# Patient Record
Sex: FEMALE | Race: WHITE | Employment: OTHER | URBAN - METROPOLITAN AREA
[De-identification: names, ages, dates, MRNs, and addresses within clinical notes are randomized per-mention and may not be internally consistent; named-entity substitution may affect disease eponyms.]

---

## 2017-06-02 ENCOUNTER — TELEPHONE (OUTPATIENT)
Dept: DERMATOLOGY | Facility: AMBULATORY SURGERY CENTER | Age: 77
End: 2017-06-02

## 2017-06-02 NOTE — TELEPHONE ENCOUNTER
Patient Appointment Date: 06/19/2017      Michel Chew, 78 y.o., female  Is calling for their Mohs Pre-Op Assessment    does not have Hepatitis C or HIV (If YES, set up consult appointment)  does confirm site      Allergies: Allergies   Allergen Reactions    Lexapro [Escitalopram] Other (comments)     Strange thoughts    Other Medication Other (comments)     Distant past : unable to take some un named anti depressants due to mild side effects. does not have a Pacemaker  does not have a Defibrillator    does not need antibiotics    is not taking NSAIDs. is not taking aspirin    is not taking garlic  is not taking ginkgo  is not taking Ginseng  is not taking fish oils  is not taking vit E    does not take a blood thinner(i.e. Coumadin/Warfarin, Plavix, Pradaxa, Xeralto, Effient)      Pre operative assessment questions asked to patient. Patient has a general understanding of the procedure, and has been versed that there will be local anesthesia used in the procedure and that She will be ok to drive themselves to and from the appointment.

## 2017-06-19 ENCOUNTER — OFFICE VISIT (OUTPATIENT)
Dept: DERMATOLOGY | Facility: AMBULATORY SURGERY CENTER | Age: 77
End: 2017-06-19

## 2017-06-19 VITALS
SYSTOLIC BLOOD PRESSURE: 142 MMHG | HEART RATE: 98 BPM | OXYGEN SATURATION: 96 % | DIASTOLIC BLOOD PRESSURE: 88 MMHG | TEMPERATURE: 98.1 F | BODY MASS INDEX: 36.5 KG/M2 | WEIGHT: 206 LBS | HEIGHT: 63 IN

## 2017-06-19 DIAGNOSIS — C44.321 SQUAMOUS CELL CARCINOMA OF BRIDGE OF NOSE: Primary | ICD-10-CM

## 2017-06-19 NOTE — MR AVS SNAPSHOT
Visit Information Date & Time Provider Department Dept. Phone Encounter #  
 6/19/2017 10:00 AM yKlie Raymundo MD [phone] Your Appointments 10/5/2017  3:00 PM  
ESTABLISHED PATIENT with MD Gail Temple IV, [address], [address] (Yessy Mucdarren) Appt Note: 6m  
 [address] [facility]  
[phone]  
  
   
 [address] [address]  
  
    
 4/16/2018  2:45 PM  
ESTABLISHED PATIENT with MD Gail Vargas GRAY, [address] (Yessy Muck) Appt Note: physical  
 [id number] [facility]  
[phone] Upcoming Health Maintenance Date Due DTaP/Tdap/Td series (1 - Tdap) 5/14/1961 MEDICARE YEARLY EXAM 5/14/2007 GLAUCOMA SCREENING Q2Y 4/7/2012 Pneumococcal 65+ Low/Medium Risk (2 of 2 - PPSV23) 10/9/2016 COLONOSCOPY 11/19/2017 INFLUENZA AGE 9 TO ADULT 8/1/2017 Allergies as of 6/19/2017  Review Complete On: 6/19/2017 By: Connor De La Torre Severity Noted Reaction Type Reactions Lexapro [Escitalopram]  12/07/2016    Other (comments) Strange thoughts Other Medication  10/18/2016    Other (comments) Distant past : unable to take some un named anti depressants due to mild side effects. Current Immunizations  Reviewed on 10/11/2015 Name Date Influenza Vaccine 10/18/2016, 10/9/2015 Pneumococcal Conjugate (PCV-13) 10/9/2015 Zoster Vaccine, Live 10/9/2015 Not reviewed this visit You Were Diagnosed With   
  
 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  
 142/88 (BP 1 Location: Left arm, BP Patient Position: Sitting) 98 98.1 °F (36.7 °C) (Oral) 5' 2.5\" (1.588 m) 206 lb (93.4 kg) 96% BMI OB Status Smoking Status 37.08 kg/m2 Hysterectomy Current Every Day Smoker BMI and BSA Data Body Mass Index Body Surface Area 37.08 kg/m 2 2.03 m 2 Preferred Pharmacy Pharmacy Name Phone [id number] AGUILAR [address] [facility] [phone] Your Updated Medication List  
  
   
This list is accurate as of: 6/19/17 10:57 AM.  Always use your most recent med list.  
  
  
  
  
 Nuno Orick Take  by mouth. 200 mg qhs  
  
 losartan 50 mg tablet Commonly known as:  COZAAR Take 1 Tab by mouth daily. REMERON 30 mg tablet Generic drug:  mirtazapine Take  by mouth nightly. Patient Instructions WOUND CARE INSTRUCTIONS 1. Keep the dressing clean and dry and do not remove for 24/48 hours. 2. Then change the dressing once a day as follows: 
a. Wash hands before and after each dressing change. b. Remove dressing and wash site gently with mild soap and water, rinse, and pat dry. 
c. Apply an ointment (Bacitracin, Polysporin, Neosporin, Petroleum jelly or Aquaphor). d. Apply a non-stick (Telfa) dressing or Band-Aid to cover the wound. Remove pressure bandage Wednesday, then wash gently and apply Vaseline and a band-aid to site daily for 1 week. 3. Watch for: BLEEDING: A small amount of drainage may occur. If bleeding occurs, elevate and rest the surgery site. Apply gauze and steady pressure for 15 minutes. If bleeding continues, call this office. INFECTION: Signs of infection include increased redness, pain, warmth, drainage of pus, and fever. If this occurs, call this office. 4. Special Instructions (follow any that are checked): ·  You have stitches that need to be removed in 0 days ·  Avoid bending at the waist and heavy lifting for two days. ·  Sleep with your head elevated for the next two nights. ·  Rest the surgery site and keep it elevated as much as possible for two days. ·  You may apply an ice-pack for 10-15 minutes every waking hour for the rest of the day. ·  Eat a soft diet and avoid hot food and hot drinks for the rest of the day. ·  Other instructions: Follow up as directed Take Tylenol for pain as needed. Once the site is healed with no remaining bandages or open areas, protect your surgical site and scar from the sun, as this area will be more sensitive. Use a broad spectrum sunscreen SPF 30 or higher daily, and a chemical free product (one containing zinc oxide or titanium dioxide) is a good choice if the area is sensitive. You may begin to gently massage the surgical site in 2-3 weeks, rubbing in a circular motion along the scar. This can help reduce swelling and thickness of a scar. A scar cream may be used beginnning 1 month after the surgery. If you have any questions or concerns, please call our office Monday through Friday at [phone]. Introducing [facility] SERVICES! Ira Jones introduces [employer] patient portal. Now you can access parts of your medical record, email your doctor's office, and request medication refills online. 1. In your internet browser, go to [email or web address] 2. Click on the First Time User? Click Here link in the Sign In box. You will see the New Member Sign Up page. 3. Enter your [employer] Access Code exactly as it appears below. You will not need to use this code after youve completed the sign-up process. If you do not sign up before the expiration date, you must request a new code. · [employer] Access Code: [email or web address] Expires: 7/4/2017  3:34 PM 
 
4. Enter the last four digits of your Social Security Number (xxxx) and Date of Birth (mm/dd/yyyy) as indicated and click Submit. You will be taken to the next sign-up page. 5. Create a [employer] ID.  This will be your [employer] login ID and cannot be changed, so think of one that is secure and easy to remember. 6. Create a [employer] password. You can change your password at any time. 7. Enter your Password Reset Question and Answer. This can be used at a later time if you forget your password. 8. Enter your e-mail address. You will receive e-mail notification when new information is available in [address] 9. Click Sign Up. You can now view and download portions of your medical record. 10. Click the Download Summary menu link to download a portable copy of your medical information. If you have questions, please visit the Frequently Asked Questions section of the [employer] website. Remember, [employer] is NOT to be used for urgent needs. For medical emergencies, dial 911. Now available from your iPhone and Android! Please provide this summary of care documentation to your next provider. Your primary care clinician is listed as [id number] [address] elizabeth IV. If you have any questions after today's visit, please call [phone].

## 2017-06-19 NOTE — PROGRESS NOTES
This note is written by Abdirahman Mora, as dictated by Hilary Nickerson. Fazal San MD.    CC: Squamous cell carcinoma on the right nasal bridge     History of present illness:     Kasie Mehta is a 78 y.o. female referred by Dr. Sumit Henriquez. 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 This is a new squamous cell carcinoma present for approximately one year described as a crusty lesion with no prior treatment. Biopsy confirmed the diagnosis of squamous cell carcinoma, and I reviewed the written pathology. She is feeling well and in her usual state of health today. She has no pain, no current illnesses, no other skin concerns. Her allergies, medications, medical, and social history are reviewed by me today. Exam:     She is an awake, alert, and oriented 78 y.o. female who appears well and in no distress. There is no preauricular, submandibular, or cervical lymphadenopathy. I examined her face. She has a  12 x 10 mm indistinct pink plaque on her right nasal bridge. She confirms location. Assessment/plan:    1. Squamous cell carcinoma, right nasal bridge. I discussed the diagnosis of squamous cell carcinoma and summarized the pathology report. Mohs surgery is indicated by site, size, poor definition, and histology. The procedure was discussed, verbal and written consent were obtained. I performed the procedure. One stage was required to reach a tumor free plane. The surgical defect was managed with a rhombic flap. There were no complications. She will follow up as needed as the site heals. Indications, risks, and options were discussed with Kasie Mehta preoperatively. Risks including, but not limited to: pain, bleeding, infection, tumor recurrence, scarring and damage to motor and/or sensory nerves, were discussed. Kasie Mehta chose Mohs surgery. Kasie Mehta was an acceptable surgery candidate.     Kasie Mehta was placed in the appropriate position on the operating table in the Mohs surgery procedure room. The area was prepped and draped in the standard manner. Gentian violet was used to outline the clinical margins of the tumor. Local anesthesia was then obtained. The grossly visible tumor was then removed, an underlying layer was excised and mapped according to the Mohs technique, and the individual specimens examined microscopically. The process was repeated until microscopic examination of the tissue specimens confirmed a tumor-free plane. Hemostasis was obtained with electrosurgery and pressure. The wound was covered between stages with moist saline gauze. Wound care instructions (written and verbal) and a follow up appointment were given to Delisa Carmona before discharge. Delisa Carmona was discharged in good condition. The wound management options of second intent healing, layered closure, local flap, or full thickness skin graft were discussed. Ms. Charissa Smyth understands the aims, risks, alternatives, and possible complications and elects to proceed with rhombic flap. Ms. Charissa Smyth was placed in a supine position on the operating table in the Mohs surgery procedure room. The area was prepped and draped in the standard manner. Gentian violet was used to outline the proposed flap.  1% lidocaine with epinephrine 1:100,000 was used to supplement the already existing anesthesia. The wound penetrated to the muscular layer and measured 14 x 14 mm. The wound margins were undermined in the subcutaneous plane. The flap was advanced into place. Hemostasis was obtained with spot electrocoagulation. 5-0 polysorb sutures were used to approximate the deep tissues and 6-0 fast gut sutures were used to approximate the skin edges. A pressure dressing utilizing Vaseline, Telfa, gauze and Coverroll was placed. Wound care instructions (written and verbal) and a follow up appointment were given to the patient before discharge. Ms. Marianne Marrero was discharged in good condition. 2. History of skin cancer. I discussed the diagnosis and recommend routine examinations with Dr. Balwinder Kohler for surveillance. The documentation recorded by the scribe accurately reflects the service I personally performed and the decisions made by me. [facility]   OFFICE PROCEDURE PROGRESS NOTE     Chart reviewed for the following:     Franny Fried MD, have reviewed the History, Physical and updated the Allergic reactions for [id number] [facility] performed immediately prior to start of procedure:     Franny Fried MD, have performed the following reviews on Rip Shows prior to the start of the procedure:     * Patient was identified by name and date of birth   * Agreement on procedure being performed was verified   * Risks and Benefits explained to the patient   * Procedure site verified and marked as necessary   * Patient was positioned for comfort   * Consent was signed and verified     Time: 10:32 AM   Date of procedure: 6/19/2017  Procedure performed by: Paramjit Kay.  Tan Fried MD   Provider assisted by: LPN   Patient assisted by: self   How tolerated by patient: tolerated the procedure well with no complications   Comments: none

## 2017-06-19 NOTE — PROGRESS NOTES
Pre-op: Patient present today for the evaluation of SCC to the Right nasal bridge. Procedure explained with full understanding. Vitals:    06/19/17 1030   BP: 142/88   Pulse: 98   Temp: 98.1 °F (36.7 °C)   TempSrc: Oral   SpO2: 96%   Weight: 93.4 kg (206 lb)   Height: 5' 2.5\" (1.588 m)     preoperatively, will continue to monitor. Post-op: Written and verbal post-op wound care instructions given to patient with full understanding of care. Surgical wound bandaged with Vaseline, Telfa, 2x2 gauze, and coverall tape. All questions and concerns addressed. Vitals stable postoperatively.

## 2017-06-19 NOTE — PATIENT INSTRUCTIONS
WOUND CARE INSTRUCTIONS    1. Keep the dressing clean and dry and do not remove for 24/48 hours. 2. Then change the dressing once a day as follows:  a. Wash hands before and after each dressing change. b. Remove dressing and wash site gently with mild soap and water, rinse, and pat dry.  c. Apply an ointment (Bacitracin, Polysporin, Neosporin, Petroleum jelly or Aquaphor). d. Apply a non-stick (Telfa) dressing or Band-Aid to cover the wound. Remove pressure bandage Wednesday, then wash gently and apply Vaseline and a band-aid to site daily for 1 week. 3. Watch for:  BLEEDING: A small amount of drainage may occur. If bleeding occurs, elevate and rest the surgery site. Apply gauze and steady pressure for 15 minutes. If bleeding continues, call this office. INFECTION: Signs of infection include increased redness, pain, warmth, drainage of pus, and fever. If this occurs, call this office. 4. Special Instructions (follow any that are checked):  · [] You have stitches that need to be removed in 0 days  · [x] Avoid bending at the waist and heavy lifting for two days. · [x] Sleep with your head elevated for the next two nights. · [x] Rest the surgery site and keep it elevated as much as possible for two days. · [x] You may apply an ice-pack for 10-15 minutes every waking hour for the rest of the day. · [] Eat a soft diet and avoid hot food and hot drinks for the rest of the day. · [] Other instructions: Follow up as directed  Take Tylenol for pain as needed. Once the site is healed with no remaining bandages or open areas, protect your surgical site and scar from the sun, as this area will be more sensitive. Use a broad spectrum sunscreen SPF 30 or higher daily, and a chemical free product (one containing zinc oxide or titanium dioxide) is a good choice if the area is sensitive. You may begin to gently massage the surgical site in 2-3 weeks, rubbing in a circular motion along the scar. This can help reduce swelling and thickness of a scar. A scar cream may be used beginnning 1 month after the surgery. If you have any questions or concerns, please call our office Monday through Friday at [phone].

## 2017-06-20 RX ORDER — BUPIVACAINE HYDROCHLORIDE AND EPINEPHRINE 2.5; 5 MG/ML; UG/ML
INJECTION, SOLUTION INFILTRATION; PERINEURAL
Qty: 1.5 ML | Refills: 0
Start: 2017-06-20 | End: 2017-08-21

## 2017-06-20 RX ORDER — LIDOCAINE HYDROCHLORIDE AND EPINEPHRINE 10; 10 MG/ML; UG/ML
3 INJECTION, SOLUTION INFILTRATION; PERINEURAL ONCE
Qty: 3 ML | Refills: 0
Start: 2017-06-20 | End: 2017-06-20

## 2017-08-21 ENCOUNTER — OFFICE VISIT (OUTPATIENT)
Dept: NEUROLOGY | Age: 77
End: 2017-08-21

## 2017-08-21 VITALS
WEIGHT: 209 LBS | RESPIRATION RATE: 18 BRPM | BODY MASS INDEX: 37.62 KG/M2 | OXYGEN SATURATION: 97 % | HEART RATE: 108 BPM | SYSTOLIC BLOOD PRESSURE: 144 MMHG | DIASTOLIC BLOOD PRESSURE: 90 MMHG

## 2017-08-21 DIAGNOSIS — I10 ESSENTIAL HYPERTENSION: ICD-10-CM

## 2017-08-21 DIAGNOSIS — Z78.9: Primary | ICD-10-CM

## 2017-08-21 DIAGNOSIS — F32.9 SINGLE CURRENT EPISODE OF MAJOR DEPRESSIVE DISORDER, UNSPECIFIED DEPRESSION EPISODE SEVERITY: ICD-10-CM

## 2017-08-21 NOTE — PROGRESS NOTES
NEUROSCIENCE [place]   NEW PATIENT EVALUATION/CONSULTATION       PATIENT NAME: Robbie Cummings    MRN: [id number]    REASON FOR CONSULTATION: Memory impairment    08/21/17      Previous records (physician notes, laboratory reports, and radiology reports) and imaging studies were reviewed and summarized. My recommendations will be communicated back to the patient's physician(s) via electronic medical record and/or by [address] [facility] [facility] mail. HISTORY OF PRESENT ILLNESS:  Robbie Cummings is a 78 y.o. right handed female presenting for evaluation of memory impairment. Onset and progression: Last 6 months, family reporting memory deficits, patient denies. Neuropsychiatric symptoms     Problems with judgment:No   Reduced interest in hobbies/activities: No   Repeats questions, stories, or statements: No    Trouble recalling people's names: No   Trouble learning how to use a tool or appliance: No   Forgetting the correct month or year: No   Difficulty handling financial affairs (bill-paying, taxes): No   Difficulty remembering appointments:No    Memory: Short term recall- she feels this may be age related  Language:  No word finding difficulty  Change in personality:None  Change in eating habits:None  Physical changes: Denies new focal deficits  Depressive symptoms: treated for depression  Hallucinations/Delusions: None    Ability to function:  Driving: independent, no MVAs or difficulty with navigation  Finances: self controlled, no issues  Cooking:yes, no issues  Manages own medication: yes, not missing doses    Prior work-up: None    Prior treatments: None      PAST MEDICAL HISTORY:  Past Medical History:   Diagnosis Date    Blood in urine     Cancer ([address]     skin : Squamous cell ca. x 2    Microhematuria 2011    negative work up .  Dr. Ari Arthur Overdose of benzodiazepine 12/12/2016    14 Lorazepam , hosp at [facility] Liam Sickle     Pancreatic cyst        PAST SURGICAL HISTORY:  Past Surgical History:   Procedure Laterality Date    HX CHOLECYSTECTOMY      HX COLONOSCOPY  11/19/14    polypectomies, f/u 3 Y, Dr. Talha Cruz      2011    HX MOHS PROCEDURES  06/19/2017    SCC right nasal bridge by Dr. Kin Motta    has rods in back    HX TONSILLECTOMY      HX UROLOGICAL      CYSTOSCOPY       FAMILY HISTORY:   Family History   Problem Relation Age of Onset    Lung Disease Mother      emphysema    Heart Disease Mother     Cancer Father      lung         SOCIAL HISTORY:  Social History     Social History    Marital status:      Spouse name: N/A    Number of children: N/A    Years of education: N/A     Social History Main Topics    Smoking status: Current Every Day Smoker     Packs/day: 1.00    Smokeless tobacco: Never Used    Alcohol use No    Drug use: None    Sexual activity: Not Asked     Other Topics Concern    None     Social History Narrative         MEDICATIONS:   Current Outpatient Prescriptions   Medication Sig Dispense Refill    losartan (COZAAR) 50 mg tablet Take 1 Tab by mouth daily. 90 Tab 3    TRAZODONE HCL (DESYREL PO) Take  by mouth. 200 mg qhs      mirtazapine (REMERON) 30 mg tablet Take  by mouth nightly. ALLERGIES:  Allergies   Allergen Reactions    Lexapro [Escitalopram] Other (comments)     Strange thoughts    Other Medication Other (comments)     Distant past : unable to take some un named anti depressants due to mild side effects. REVIEW OF SYSTEMS:  10 point ROS reviewed with patient. Please see scanned document under media. PHYSICAL EXAM:  Vital Signs:   Visit Vitals    /90    Pulse (!) 108    Resp 18    Wt 94.8 kg (209 lb)    SpO2 97%    BMI 37.62 kg/m2        General Medical Exam:  General:  Well appearing, comfortable, in no apparent distress. Eyes/ENT: see cranial nerve examination. Neck: No masses appreciated. Full range of motion without tenderness.   Respiratory:  Clear to auscultation, good air entry bilaterally. Cardiac:  Regular rate and rhythm, no murmur. GI:  Soft, non-tender, non-distended abdomen. Bowel sounds normal. No masses, organomegaly. Extremities:  No deformities, edema, or skin discoloration. Skin:  No rashes or lesions. Neurological:  · Mental Status:  Alert and oriented to person, place, and time with fluent speech. · MOCA: 29/30 (see scanned media)  · Cranial Nerves:   CNII/III/IV/VI: visual fields full to confrontation, EOMI, PERRL, no ptosis or nystagmus. CN V: Facial sensation intact bilaterally, masseter 5/5   CN VII: Facial muscles slightly asymmetric, mild L decreased NL fold  CN VIII: Hears finger rub well bilaterally, intact vestibular function   CN IX/X: Normal palatal movement   CN XI: Full strength shoulder shrug bilaterally   CN XII: Tongue protrusion full and midline without fasciculation or atrophy  · Motor: Normal tone and muscle bulk with no pronator drift. No atrophy or fasciculations present on examination. Individual muscle group testing:  Shoulder abduction:   Left:5/5   Right : 5/5    Shoulder adduction:   Left:5/5   Right : 5/5    Elbow flexion:      Left:5/5   Right : 5/5  Elbow extension:    Left:5/5   Right : 5/5   Wrist flexion:    Left:5/5   Right : 5/5  Wrist extension:    Left:5/5   Right : 5/5  Arm pronation:   Left:5/5   Right : 5/5  Arm supination:   Left:5/5   Right : 5/5    Finger flexion:    Left:5/5   Right : 5/5    Finger extension:   Left:5/5   Right : 5/5   Finger abduction:  Left:5/5   Right : 5/5   Finger adduction:   Left:5/5   Right : 5/5  Hip flexion:     Left:5/5   Right : 5/5         Hip extension:   Left:5/5   Right : 5/5    Knee flexion:    Left:5/5   Right : 5/5    Knee extension:   Left:5/5   Right : 5/5    Dorsiflexion:     Left:5/5   Right : 5/5  Plantar flexion:    Left:5/5   Right : 5/5      · MSRs: No crossed adductors or clonus.          RIGHT  LEFT   Brachioradialis 2+ 2+   Biceps 2+ 2+   Triceps 2+ 2+   Knee 2+ 2+ Achilles 2+ 2+        Plantar response Downward Downward          · Sensation: Normal and symmetric perception of pinprick, temperature, light touch, proprioception, and vibration; (-) Romberg. · Coordination: No dysmetria. Normal rapid alternating movements; finger-to-nose and heel-to- shin testing are within normal limits. · Gait: Normal native and stress (tandem/heel/toe walking). PERTINENT DATA:  INTERNAL RECORDS:  The patient's electronic medical record was reviewed. The relevant details include:    MRI Results (maximum last 3): Results from [place] encounter on 08/12/16   MRI BRAIN W WO CONT   Narrative **Final Report**      ICD Codes / Adm. Diagnosis: 298.9  R41.0 / Unspecified psychosis    Disorientation, unspecified  Examination:  MRI BRAIN W AND Apolonia Daniels  - [id number] - Aug 12 2016  1:31PM  Accession No:  [id number]  Reason:  confusion      REPORT:  EXAM:  MRI BRAIN W AND WO CON  INDICATION:  confusion  COMPARISON:  None. TECHNIQUE:  MR imaging of the brain was performed with sagittal T1, axial   T1, T2, FLAIR, GRE, DWI/ADC; pre and post contrast multiplanar T1 utilizing   9 mL Gadavist.         FINDINGS:    The ventricles are normal in size and are midline. There is no    intracranial hemorrhage  or extra-axial fluid collection. There is mild T2   hyperintensity in the deep cerebral white matter, likely due to age and   intracranial small vessel disease. No mass lesions are demonstrated. There   is no acute infarction. The major intracranial vascular flow-voids are   patent. There is no abnormal parenchymal or meningeal enhancement. The   paranasal sinuses and mastoid air cells are clear. IMPRESSION:   Mild white matter signal abnormality likely due to age and intracranial   small vessel disease. Otherwise negative brain MRI.            Signing/Reading Doctor: Brian Bhatt ([id number])    Approved: Brian Bhatt ([id number])  Aug 12 2016  1:44PM ASSESSMENT:      ICD-10-CM ICD-9-CM    1. No impairment of memory Z78.9 V49.89    2. Essential hypertension I10 401.9    3. Single current episode of major depressive disorder, unspecified depression episode severity F32.9 36.20    78year old female presenting with memory impairment reported by family (not accompanied at today's appointment-patient denies sx). MOCA 29/30 and not consistent with MCI/dementia. She is testing very appropriately for age. MRI brain from 8/2016 reviewed with mild microvascular ischemic changes, no significant atrophy. Encouraged continued heart healthy diet and exercise as well as regular scheduled cognitive and social engagement. Follow-up Disposition:  Return if symptoms worsen or fail to improve. Greg Parks, DO  Staff Neurologist  Diplomate, [facility] Board of Psychiatry & Neurology       CC Referring provider:    Kirsten Kahn MD

## 2017-08-21 NOTE — PATIENT INSTRUCTIONS
[address] [facility]   Statement to Patients  April 1, 2014      In an effort to ensure the large volume of patient prescription refills is processed in the most efficient and expeditious manner, we are asking our patients to assist us by calling your Pharmacy for all prescription refills, this will include also your  Mail Order Pharmacy. The pharmacy will contact our office electronically to continue the refill process. Please do not wait until the last minute to call your pharmacy. We need at least 48 hours (2days) to fill prescriptions. We also encourage you to call your pharmacy before going to  your prescription to make sure it is ready. With regard to controlled substance prescription refill requests (narcotic refills) that need to be picked up at our office, we ask your cooperation by providing us with at least 72 hours (3days) notice that you will need a refill. We will not refill narcotic prescription refill requests after 4:00pm on any weekday, Monday through Thursday, or after 2:00pm on Fridays, or on the weekends. We encourage everyone to explore another way of getting your prescription refill request processed using [employer], our patient web portal through our electronic medical record system. [employer] is an efficient and effective way to communicate your medication request directly to the office and  downloadable as an mateo on your smart phone . [employer] also features a review functionality that allows you to view your medication list as well as leave messages for your physician. Are you ready to get connected? If so please review the attatched instructions or speak to any of our staff to get you set up right away! Thank you so much for your cooperation. Should you have any questions please contact our Practice Administrator.     The Physicians and Staff,  [facility]     Thank you for choosing [facility] for your     care. You may receive a survey about your visit. We appreciate you taking time     to complete this survey as we use your feedback to improve our services. We     realize we are not perfect, but we strive to provide excellent care.

## 2017-08-21 NOTE — MR AVS SNAPSHOT
Visit Information Date & Time Provider Department Dept. Phone Encounter #  
 8/21/2017  3:00 PM Vi Engle, [facility] at [address] [id number] Follow-up Instructions Return if symptoms worsen or fail to improve. Your Appointments 10/5/2017  3:00 PM  
ESTABLISHED PATIENT with MD Jose Peguero IV, [address], [address] ([address]) Appt Note: 6m  
 [id number] [facility] [address]  
[phone]  
  
   
 [address] [id number]  
  
    
 4/16/2018  2:45 PM  
ESTABLISHED PATIENT with MD Jose Simmons GRAY, [address] ([address]) Appt Note: physical  
 [address] [facility] [address]  
[phone] Upcoming Health Maintenance Date Due DTaP/Tdap/Td series (1 - Tdap) 5/14/1961 MEDICARE YEARLY EXAM 5/14/2007 GLAUCOMA SCREENING Q2Y 4/7/2012 Pneumococcal 65+ Low/Medium Risk (2 of 2 - PPSV23) 10/9/2016 INFLUENZA AGE 9 TO ADULT 8/1/2017 COLONOSCOPY 11/19/2017 Allergies as of 8/21/2017  Review Complete On: 8/21/2017 By: Vi Engle DO Severity Noted Reaction Type Reactions Lexapro [Escitalopram]  12/07/2016    Other (comments) Strange thoughts Other Medication  10/18/2016    Other (comments) Distant past : unable to take some un named anti depressants due to mild side effects. Current Immunizations  Reviewed on 10/11/2015 Name Date Influenza Vaccine 10/18/2016, 10/9/2015 Pneumococcal Conjugate (PCV-13) 10/9/2015 Zoster Vaccine, Live 10/9/2015 Not reviewed this visit You Were Diagnosed With   
  
 Codes Comments No impairment of memory    -  Primary ICD-10-CM: Z78.9 ICD-9-CM: V49.89 Essential hypertension     ICD-10-CM: I10 
ICD-9-CM: 401.9  Single current episode of major depressive disorder, unspecified depression episode severity     ICD-10-CM: F32.9 ICD-9-CM: 296.20 Vitals BP Pulse Resp Weight(growth percentile) SpO2 BMI  
 144/90 (!) 108 18 209 lb (94.8 kg) 97% 37.62 kg/m2 OB Status Smoking Status Hysterectomy Current Every Day Smoker BMI and BSA Data Body Mass Index Body Surface Area  
 37.62 kg/m 2 2.04 m 2 Preferred Pharmacy Pharmacy Name Phone [id number]- [place], [address] [facility] [phone] Your Updated Medication List  
  
   
This list is accurate as of: 8/21/17  3:13 PM.  Always use your most recent med list.  
  
  
  
  
 Ana Laura Rueda Take  by mouth. 200 mg qhs  
  
 losartan 50 mg tablet Commonly known as:  COZAAR Take 1 Tab by mouth daily. REMERON 30 mg tablet Generic drug:  mirtazapine Take  by mouth nightly. Follow-up Instructions Return if symptoms worsen or fail to improve. Patient Instructions PRESCRIPTION REFILL POLICY [facility] Statement to Patients April 1, 2014 In an effort to ensure the large volume of patient prescription refills is processed in the most efficient and expeditious manner, we are asking our patients to assist us by calling your Pharmacy for all prescription refills, this will include also your  Mail Order Pharmacy. The pharmacy will contact our office electronically to continue the refill process. Please do not wait until the last minute to call your pharmacy. We need at least 48 hours (2days) to fill prescriptions. We also encourage you to call your pharmacy before going to  your prescription to make sure it is ready. With regard to controlled substance prescription refill requests (narcotic refills) that need to be picked up at our office, we ask your cooperation by providing us with at least 72 hours (3days) notice that you will need a refill. We will not refill narcotic prescription refill requests after 4:00pm on any weekday, Monday through Thursday, or after 2:00pm on Fridays, or on the weekends. We encourage everyone to explore another way of getting your prescription refill request processed using [employer], our patient web portal through our electronic medical record system. [employer] is an efficient and effective way to communicate your medication request directly to the office and  downloadable as an mateo on your smart phone . [employer] also features a review functionality that allows you to view your medication list as well as leave messages for your physician. Are you ready to get connected? If so please review the attatched instructions or speak to any of our staff to get you set up right away! Thank you so much for your cooperation. Should you have any questions please contact our Practice Administrator. The Physicians and Staff,  [facility] Thank you for choosing [facility] and [facility] for your  
 
care. You may receive a survey about your visit. We appreciate you taking time  
 
to complete this survey as we use your feedback to improve our services. We  
 
realize we are not perfect, but we strive to provide excellent care. [facility]! [facility] introduces [employer] patient portal. Now you can access parts of your medical record, email your doctor's office, and request medication refills online. 1. In your internet browser, go to [email or web address] 2. Click on the First Time User? Click Here link in the Sign In box. You will see the New Member Sign Up page. 3. Enter your [employer] Access Code exactly as it appears below. You will not need to use this code after youve completed the sign-up process. If you do not sign up before the expiration date, you must request a new code.  
 
· [employer] Access Code: [email or web address] 
 Expires: 11/19/2017  3:13 PM 
 
4. Enter the last four digits of your Social Security Number (xxxx) and Date of Birth (mm/dd/yyyy) as indicated and click Submit. You will be taken to the next sign-up page. 5. Create a internetstores ID. This will be your internetstores login ID and cannot be changed, so think of one that is secure and easy to remember. 6. Create a internetstores password. You can change your password at any time. 7. Enter your Password Reset Question and Answer. This can be used at a later time if you forget your password. 8. Enter your e-mail address. You will receive e-mail notification when new information is available in [address] 9. Click Sign Up. You can now view and download portions of your medical record. 10. Click the Download Summary menu link to download a portable copy of your medical information. If you have questions, please visit the Frequently Asked Questions section of the internetstores website. Remember, [employer]stores is NOT to be used for urgent needs. For medical emergencies, dial 911. Now available from your iPhone and Android! Please provide this summary of care documentation to your next provider. Your primary care clinician is listed as [id number] [address] IV. If you have any questions after today's visit, please call [phone].

## 2017-08-23 ENCOUNTER — TELEPHONE (OUTPATIENT)
Dept: NEUROLOGY | Age: 77
End: 2017-08-23

## 2017-08-23 NOTE — TELEPHONE ENCOUNTER
Spoke with patient's daughter. She asked how to go about getting a copy of the office visit from 8/21/17 as her mother told her she did not need to come back to see Dr. Coy Reno. Writer stated she could  a copy of the office note (HIPAA verified) with her photo ID. Advised that in Dr. Amanda Yang note Kwasi Link is testing very appropriately for her age\" and she left the follow up plan to an as needed basis. Ms. Jian Leavitt was given an opportunity to ask questions, repeated information, and verbalized understanding.

## 2017-08-23 NOTE — TELEPHONE ENCOUNTER
Pt's daughter Aubrey Madelyn) would like to talk to the nurse regarding pt's appt on 8/21.  Please call back

## 2017-12-08 ENCOUNTER — ANESTHESIA EVENT (OUTPATIENT)
Dept: ENDOSCOPY | Age: 77
End: 2017-12-08
Payer: MEDICARE

## 2017-12-08 ENCOUNTER — ANESTHESIA (OUTPATIENT)
Dept: ENDOSCOPY | Age: 77
End: 2017-12-08
Payer: MEDICARE

## 2017-12-08 ENCOUNTER — HOSPITAL ENCOUNTER (OUTPATIENT)
Age: 77
Setting detail: OUTPATIENT SURGERY
Discharge: HOME | End: 2017-12-08
Attending: INTERNAL MEDICINE | Admitting: INTERNAL MEDICINE
Payer: MEDICARE

## 2017-12-08 VITALS
HEIGHT: 62 IN | RESPIRATION RATE: 20 BRPM | OXYGEN SATURATION: 99 % | SYSTOLIC BLOOD PRESSURE: 125 MMHG | BODY MASS INDEX: 36.8 KG/M2 | HEART RATE: 73 BPM | WEIGHT: 200 LBS | TEMPERATURE: 97.8 F | DIASTOLIC BLOOD PRESSURE: 75 MMHG

## 2017-12-08 PROCEDURE — 76040000019: Performed by: INTERNAL MEDICINE

## 2017-12-08 PROCEDURE — 74011250636 HC RX REV CODE- 250/636

## 2017-12-08 PROCEDURE — 88305 TISSUE EXAM BY PATHOLOGIST: CPT | Performed by: INTERNAL MEDICINE

## 2017-12-08 PROCEDURE — 77030011640 HC PAD GRND REM COVD -A: Performed by: INTERNAL MEDICINE

## 2017-12-08 PROCEDURE — 76060000031 HC ANESTHESIA FIRST 0.5 HR: Performed by: INTERNAL MEDICINE

## 2017-12-08 PROCEDURE — 74011250636 HC RX REV CODE- 250/636: Performed by: INTERNAL MEDICINE

## 2017-12-08 PROCEDURE — 77030009426 HC FCPS BIOP ENDOSC BSC -B: Performed by: INTERNAL MEDICINE

## 2017-12-08 PROCEDURE — 77030013992 HC SNR POLYP ENDOSC BSC -B: Performed by: INTERNAL MEDICINE

## 2017-12-08 RX ORDER — PROPOFOL 10 MG/ML
INJECTION, EMULSION INTRAVENOUS
Status: DISCONTINUED | OUTPATIENT
Start: 2017-12-08 | End: 2017-12-08 | Stop reason: HOSPADM

## 2017-12-08 RX ORDER — NALOXONE HYDROCHLORIDE 0.4 MG/ML
0.4 INJECTION, SOLUTION INTRAMUSCULAR; INTRAVENOUS; SUBCUTANEOUS
Status: DISCONTINUED | OUTPATIENT
Start: 2017-12-08 | End: 2017-12-08 | Stop reason: HOSPADM

## 2017-12-08 RX ORDER — ATROPINE SULFATE 0.1 MG/ML
0.4 INJECTION INTRAVENOUS
Status: DISCONTINUED | OUTPATIENT
Start: 2017-12-08 | End: 2017-12-08 | Stop reason: HOSPADM

## 2017-12-08 RX ORDER — EPINEPHRINE 0.1 MG/ML
1 INJECTION INTRACARDIAC; INTRAVENOUS
Status: DISCONTINUED | OUTPATIENT
Start: 2017-12-08 | End: 2017-12-08 | Stop reason: HOSPADM

## 2017-12-08 RX ORDER — SODIUM CHLORIDE 9 MG/ML
INJECTION, SOLUTION INTRAVENOUS
Status: DISCONTINUED | OUTPATIENT
Start: 2017-12-08 | End: 2017-12-08 | Stop reason: HOSPADM

## 2017-12-08 RX ORDER — PROPOFOL 10 MG/ML
INJECTION, EMULSION INTRAVENOUS AS NEEDED
Status: DISCONTINUED | OUTPATIENT
Start: 2017-12-08 | End: 2017-12-08 | Stop reason: HOSPADM

## 2017-12-08 RX ORDER — MIDAZOLAM HYDROCHLORIDE 1 MG/ML
.25-5 INJECTION, SOLUTION INTRAMUSCULAR; INTRAVENOUS
Status: DISCONTINUED | OUTPATIENT
Start: 2017-12-08 | End: 2017-12-08 | Stop reason: HOSPADM

## 2017-12-08 RX ORDER — SODIUM CHLORIDE 9 MG/ML
50 INJECTION, SOLUTION INTRAVENOUS CONTINUOUS
Status: DISCONTINUED | OUTPATIENT
Start: 2017-12-08 | End: 2017-12-08 | Stop reason: HOSPADM

## 2017-12-08 RX ORDER — FLUMAZENIL 0.1 MG/ML
0.2 INJECTION INTRAVENOUS
Status: DISCONTINUED | OUTPATIENT
Start: 2017-12-08 | End: 2017-12-08 | Stop reason: HOSPADM

## 2017-12-08 RX ORDER — DEXTROMETHORPHAN/PSEUDOEPHED 2.5-7.5/.8
1.2 DROPS ORAL
Status: DISCONTINUED | OUTPATIENT
Start: 2017-12-08 | End: 2017-12-08 | Stop reason: HOSPADM

## 2017-12-08 RX ADMIN — PROPOFOL 100 MG: 10 INJECTION, EMULSION INTRAVENOUS at 09:41

## 2017-12-08 RX ADMIN — PROPOFOL 30 MG: 10 INJECTION, EMULSION INTRAVENOUS at 09:46

## 2017-12-08 RX ADMIN — SODIUM CHLORIDE: 9 INJECTION, SOLUTION INTRAVENOUS at 09:35

## 2017-12-08 RX ADMIN — PROPOFOL 75 MCG/KG/MIN: 10 INJECTION, EMULSION INTRAVENOUS at 09:41

## 2017-12-08 RX ADMIN — SODIUM CHLORIDE 50 ML/HR: 900 INJECTION, SOLUTION INTRAVENOUS at 09:05

## 2017-12-08 NOTE — H&P
Evert Hatfield M.D.  [phone]            History and Physical       NAME:  Alexey Webb   :   1940   MRN:   [id number]           Consult Date: 2017 9:30 AM    Chief Complaint:  Colon polyp surveillance    History of Present Illness:  Patient is a 78 y.o. who is seen for colon cancer screening and colon polyp surveillance. Denies any ongoing GI complaints. PMH:  Past Medical History:   Diagnosis Date    Blood in urine     Cancer ([address]     skin : Squamous cell ca. x 2    Hypertension     Microhematuria     negative work up . Dr. Chantale Gilliland Overdose of benzodiazepine 2016    14 Lorazepam , hosp [facility]     Pancreatic cyst        PSH:  Past Surgical History:   Procedure Laterality Date    HX CHOLECYSTECTOMY      HX COLONOSCOPY  14    polypectomies, f/u 3 Y, Dr. Jen Mae  2017    SCC right nasal bridge by Dr. Lyndsey Carranza    has rods in back    HX TONSILLECTOMY      HX UROLOGICAL      CYSTOSCOPY       Allergies: Allergies   Allergen Reactions    Lexapro [Escitalopram] Other (comments)     Strange thoughts    Other Medication Other (comments)     Distant past : unable to take some un named anti depressants due to mild side effects. Home Medications:  Prior to Admission Medications   Prescriptions Last Dose Informant Patient Reported? Taking? TRAZODONE HCL (DESYREL PO) 2017 at 2200  Yes Yes   Sig: Take  by mouth. 200 mg qhs   losartan (COZAAR) 50 mg tablet 2017 at 2200  No Yes   Sig: Take 1 Tab by mouth daily. mirtazapine (REMERON) 30 mg tablet 2017 at 2200  Yes Yes   Sig: Take  by mouth nightly.       Facility-Administered Medications: None       Hospital Medications:  Current Facility-Administered Medications   Medication Dose Route Frequency    0.9% sodium chloride infusion  50 mL/hr IntraVENous CONTINUOUS    midazolam (VERSED) injection 0.25-5 mg  0.25-5 mg IntraVENous Multiple    naloxone (NARCAN) injection 0.4 mg  0.4 mg IntraVENous Multiple    flumazenil (ROMAZICON) 0.1 mg/mL injection 0.2 mg  0.2 mg IntraVENous Multiple    simethicone (MYLICON) 81EX/6.0WO oral drops 80 mg  1.2 mL Oral Multiple    atropine injection 0.4 mg  0.4 mg IntraVENous ONCE PRN    EPINEPHrine (ADRENALIN) 0.1 mg/mL syringe 1 mg  1 mg Endoscopically ONCE PRN       Social History:  Social History   Substance Use Topics    Smoking status: Current Every Day Smoker     Packs/day: 1.00    Smokeless tobacco: Never Used    Alcohol use No       Family History:  Family History   Problem Relation Age of Onset    Lung Disease Mother      emphysema    Heart Disease Mother     Cancer Father      lung             Review of Systems:      Constitutional: negative fever, negative chills, negative weight loss  Eyes:   negative visual changes  ENT:   negative sore throat, tongue or lip swelling  Respiratory:  negative cough, negative dyspnea  Cards:  negative for chest pain, palpitations, lower extremity edema  GI:   See HPI  :  negative for frequency, dysuria  Integument:  negative for rash and pruritus  Heme:  negative for easy bruising and gum/nose bleeding  Musculoskel: negative for myalgias,  back pain and muscle weakness  Neuro: negative for headaches, dizziness, vertigo  Psych:  negative for feelings of anxiety, depression       Objective:   Patient Vitals for the past 8 hrs:   BP Temp Pulse Resp SpO2 Height Weight   12/08/17 0855 123/75 98.2 °F (36.8 °C) 77 13 95 % 5' 2\" (1.575 m) 90.7 kg (200 lb)             EXAM:     NEURO-a&o   HEENT-wnl   LUNGS-clear    COR-regular rate and rhythym     ABD-soft , no tenderness, no rebound, good bs     EXT-no edema     Data Review     No results for input(s): WBC, HGB, HCT, PLT, HGBEXT, HCTEXT, PLTEXT in the last 72 hours. No results for input(s): NA, K, CL, CO2, BUN, CREA, GLU, PHOS, CA in the last 72 hours.   No results for input(s): SGOT, GPT, AP, TBIL, TP, ALB, GLOB, GGT, AML, LPSE in the last 72 hours. No lab exists for component: AMYP, HLPSE  No results for input(s): INR, PTP, APTT in the last 72 hours. No lab exists for component: INREXT    Patient Active Problem List   Diagnosis Code    Tobacco abuse Z72.0    Essential hypertension I10    Single current episode of major depressive disorder F32.9    Overdose of benzodiazepine T42.4X1A      Assessment:   · Colon cancer screening and colon polyp surveillance   Plan:   · Colonoscopy today.      Signed By: Lacy Helton MD     12/8/2017  9:30 AM

## 2017-12-08 NOTE — PERIOP NOTES
Report from Perez Haile CRNA, see anesthesia record. ABD remains soft and non-tender post procedure. Pt has no complaints at this time and tolerated the procedure well.

## 2017-12-08 NOTE — ANESTHESIA PREPROCEDURE EVALUATION
Anesthetic History   No history of anesthetic complications            Review of Systems / Medical History  Patient summary reviewed, nursing notes reviewed and pertinent labs reviewed    Pulmonary  Within defined limits                 Neuro/Psych   Within defined limits           Cardiovascular    Hypertension                   GI/Hepatic/Renal  Within defined limits              Endo/Other  Within defined limits           Other Findings              Physical Exam    Airway  Mallampati: II  TM Distance: 4 - 6 cm  Neck ROM: normal range of motion   Mouth opening: Normal     Cardiovascular    Rhythm: regular  Rate: normal         Dental  No notable dental hx       Pulmonary  Breath sounds clear to auscultation               Abdominal         Other Findings            Anesthetic Plan    ASA: 2  Anesthesia type: MAC            Anesthetic plan and risks discussed with: Patient

## 2017-12-08 NOTE — PROGRESS NOTES
Radha [facility]  1940  [id number]    Situation:  Verbal report received from: WILLIAM John rn  Procedure: Procedure(s):  COLONOSCOPY  ENDOSCOPIC POLYPECTOMY  COLON BIOPSY    Background:    Preoperative diagnosis: HISTORY OF POLYPS  Postoperative diagnosis: Diverticulosis  Colon Polys  Hemorrhoids      :  Dr. Hesham Llamas  Assistant(s): Endoscopy Technician-1: Shavon De Guzman  Endoscopy RN-1: Jannie Barry RN    Specimens:   ID Type Source Tests Collected by Time Destination   1 : Rectum Polyp Preservative   Aviva Wise MD 12/8/2017 1166 Pathology   2 : Cecum Polyps Gregative   Aviva Wise MD 12/8/2017 4432 Pathology   3 : Random Colon Biopsies Gregative   Aviva Wise MD 12/8/2017 6276 Pathology   4 : Transverse Polyp Beto Wise MD 12/8/2017 0394 Pathology   5 : Erin Hill MD 12/8/2017 0957 Pathology     H. Pylori  no    Assessment:  Intra-procedure medications   VAnesthesia gave intra-procedure sedation and medications, see anesthesia flow sheet yes    Intravenous fluids: NS@ KVO     Vital signs stable     Abdominal assessment: round and soft     Recommendation:  Discharge patient per MD order. Family or Friend   Permission to share finding with family or friend yes  Endoscopy discharge instructions have been reviewed and given to patient. The patient verbalized understanding and acceptance of instructions.

## 2017-12-08 NOTE — IP AVS SNAPSHOT
[address] 
[phone] Patient: Hina Smith MRN: [id number] GHX: About your hospitalization You were admitted on:  2017 You last received care in the:  [facility] ENDOSCOPY You were discharged on:  2017 Why you were hospitalized Your primary diagnosis was:  Not on File Things You Need To Do (next 8 weeks)  ESTABLISHED PATIENT with Makayla Narvaez MD at 11:30 AM  
Where:  [address], [address] ([facility]) Discharge Orders None A check salma indicates which time of day the medication should be taken. My Medications TAKE these medications as instructed Instructions Each Dose to Equal  
 Morning Noon Evening Bedtime DESYREL PO Your last dose was: Your next dose is: Take  by mouth. 200 mg qhs  
     
   
   
   
  
 losartan 50 mg tablet Commonly known as:  COZAAR Your last dose was: Your next dose is: Take 1 Tab by mouth daily. 50 mg  
    
   
   
   
  
 REMERON 30 mg tablet Generic drug:  mirtazapine Your last dose was: Your next dose is: Take  by mouth nightly. Discharge Instructions [address] [facility]. [facility] Barbie Gregg M.D. 
[phone] COLON DISCHARGE INSTRUCTIONS 
    
2017 Hina Smith :  1940 [facility] Medical Record Number:  [id number] COLONOSCOPY FINDINGS: 
Your colonoscopy showed a total of 4 polyps that were removed and sent to pathology. Evidence of diverticulosis noted throughout the colon and small internal hemorrhoids. DISCOMFORT: 
Redness at IV site- apply warm compress to area; if redness or soreness persist- contact your physician There may be a slight amount of blood passed from the rectum Gaseous discomfort- walking, belching will help relieve any discomfort You may not operate a vehicle for 12 hours You may not engage in an occupation involving machinery or appliances for rest of today You may not drink alcoholic beverages for at least 12 hours Avoid making any critical decisions for at least 24 hour DIET: 
 High fiber diet. Low fat diet.  however -  remember your colon is empty and a heavy meal will produce gas. Avoid these foods:  vegetables, fried / greasy foods, carbonated drinks for today ACTIVITY: 
You may resume your normal daily activities it is recommended that you spend the remainder of the day resting -  avoid any strenuous activity. CALL M.D. ANY SIGN OF: Increasing pain, nausea, vomiting Abdominal distension (swelling) New increased bleeding (oral or rectal) Fever (chills) Pain in chest area Bloody discharge from nose or mouth Shortness of breath Follow-up Instructions: 
 Call Dr. Edith Leventhal if any questions or problems. Telephone # [phone] Biopsy results will be available in  5 to 7 days. Take metamucil 1 tablespoon daily with 4 oz water or yogurt. Should have a repeat colonoscopy in 3 years. Introducing [facility] & HEALTH SERVICES! [facility] introduces PadMatcher patient portal. Now you can access parts of your medical record, email your doctor's office, and request medication refills online. 1. In your internet browser, go to [email or web address] 2. Click on the First Time User? Click Here link in the Sign In box. You will see the New Member Sign Up page. 3. Enter your PadMatcher Access Code exactly as it appears below. You will not need to use this code after youve completed the sign-up process. If you do not sign up before the expiration date, you must request a new code. · PadMatcher Access Code: [email or web address] Expires: 3/8/2018  7:48 AM 
 
 4. Enter the last four digits of your Social Security Number (xxxx) and Date of Birth (mm/dd/yyyy) as indicated and click Submit. You will be taken to the next sign-up page. 5. Create a Tamion ID. This will be your Tamion login ID and cannot be changed, so think of one that is secure and easy to remember. 6. Create a Tamion password. You can change your password at any time. 7. Enter your Password Reset Question and Answer. This can be used at a later time if you forget your password. 8. Enter your e-mail address. You will receive e-mail notification when new information is available in [address] 9. Click Sign Up. You can now view and download portions of your medical record. 10. Click the Download Summary menu link to download a portable copy of your medical information. If you have questions, please visit the Frequently Asked Questions section of the Tamion website. Remember, Tamion is NOT to be used for urgent needs. For medical emergencies, dial 911. Now available from your iPhone and Android! Providers Seen During Your Hospitalization Provider Specialty Primary office phone Johan Tang MD Gastroenterology [phone] Your Primary Care Physician (PCP) Primary Care Physician Office Phone Office Fax S-Tari [address] [place] [address] [phone] You are allergic to the following Allergen Reactions Lexapro (Escitalopram) Other (comments) Strange thoughts Other Medication Other (comments) Distant past : unable to take some un named anti depressants due to mild side effects. Recent Documentation Height Weight Breastfeeding? BMI OB Status Smoking Status 1.575 m 90.7 kg No 36.58 kg/m2 Hysterectomy Current Every Day Smoker Emergency Contacts Name Discharge Info Relation Home Work Mobile [facility] DISCHARGE CAREGIVER [3] Daughter [21]   [phone] Lesly Us DISCHARGE CAREGIVER [3] Other Relative [6]   [phone] Gerson Chunan  Child [2] [phone] Patient Belongings The following personal items are in your possession at time of discharge: 
  Dental Appliances: Partials (in coat pocket)  Visual Aid: None Please provide this summary of care documentation to your next provider. Signatures-by signing, you are acknowledging that this After Visit Summary has been reviewed with you and you have received a copy. Patient Signature:  ____________________________________________________________ Date:  ____________________________________________________________  
  
[facility] Provider Signature:  ____________________________________________________________ Date:  ____________________________________________________________

## 2017-12-08 NOTE — ANESTHESIA POSTPROCEDURE EVALUATION
Post-Anesthesia Evaluation and Assessment    Patient: Vita Stinson MRN: [id number]  SSN: [id number]    YOB: 1940  Age: 78 y.o. Sex: female       Cardiovascular Function/Vital Signs  Visit Vitals    /75    Pulse 77    Temp 36.8 °C (98.2 °F)    Resp 13    Ht 5' 2\" (1.575 m)    Wt 90.7 kg (200 lb)    SpO2 95%    Breastfeeding No    BMI 36.58 kg/m2       Patient is status post MAC anesthesia for Procedure(s):  COLONOSCOPY  ENDOSCOPIC POLYPECTOMY  COLON BIOPSY. Nausea/Vomiting: None    Postoperative hydration reviewed and adequate. Pain:  Pain Scale 1: Numeric (0 - 10) (12/08/17 0855)  Pain Intensity 1: 0 (12/08/17 0855)   Managed    Neurological Status: At baseline    Mental Status and Level of Consciousness: Arousable    Pulmonary Status:   O2 Device: Room air (12/08/17 0855)   Adequate oxygenation and airway patent    Complications related to anesthesia: None    Post-anesthesia assessment completed.  No concerns    Signed By: Sheridan Abad MD     December 8, 2017

## 2018-08-03 ENCOUNTER — OFFICE VISIT (OUTPATIENT)
Dept: NEUROLOGY | Age: 78
End: 2018-08-03

## 2018-08-03 ENCOUNTER — DOCUMENTATION ONLY (OUTPATIENT)
Dept: NEUROLOGY | Age: 78
End: 2018-08-03

## 2018-08-03 VITALS
HEART RATE: 79 BPM | DIASTOLIC BLOOD PRESSURE: 94 MMHG | BODY MASS INDEX: 36.62 KG/M2 | RESPIRATION RATE: 18 BRPM | WEIGHT: 199 LBS | OXYGEN SATURATION: 96 % | HEIGHT: 62 IN | SYSTOLIC BLOOD PRESSURE: 150 MMHG

## 2018-08-03 DIAGNOSIS — R46.81 OBSESSIVE BEHAVIOR: ICD-10-CM

## 2018-08-03 DIAGNOSIS — R46.89 ABNORMAL BEHAVIOR: Primary | ICD-10-CM

## 2018-08-03 RX ORDER — FLUVOXAMINE MALEATE 100 MG/1
TABLET, COATED ORAL
Qty: 60 TAB | Refills: 5 | Status: SHIPPED | OUTPATIENT
Start: 2018-08-03 | End: 2018-11-08

## 2018-08-03 NOTE — MR AVS SNAPSHOT
[facility] [address] 
[phone] Patient: Gemma Leon MRN: [id number] W:3[birth year] Visit Information Date & Time Provider Department Dept. Phone Encounter #  
 8/3/2018 10:00 AM [facility] at [address] [id number] Your Appointments 4/22/2019  2:30 PM  
ESTABLISHED PATIENT with MD Jose Beckford [address] ([facility]) Appt Note: physical  
 [address] [id number]  
[phone]  
  
   
 [address] [address] [id number] Upcoming Health Maintenance Date Due DTaP/Tdap/Td series (1 - Tdap) 5/14/1963 GLAUCOMA SCREENING Q2Y 4/7/2012 Pneumococcal 65+ Low/Medium Risk (2 of 2 - PPSV23) 10/9/2016 Influenza Age 5 to Adult 8/1/2018 MEDICARE YEARLY EXAM 4/17/2019 COLONOSCOPY 12/8/2020 Allergies as of 8/3/2018  Review Complete On: 8/3/2018 By: Hindman Angles, LPN Severity Noted Reaction Type Reactions Lexapro [Escitalopram]  12/07/2016    Other (comments) Strange thoughts Other Medication  10/18/2016    Other (comments) Distant past : unable to take some un named anti depressants due to mild side effects. Current Immunizations  Reviewed on 10/11/2015 Name Date Influenza Vaccine 10/18/2016, 10/9/2015 Pneumococcal Conjugate (PCV-13) 10/9/2015 Zoster Vaccine, Live 10/9/2015 Not reviewed this visit You Were Diagnosed With   
  
 Codes Comments Abnormal behavior    -  Primary ICD-10-CM: R46.89 
ICD-9-CM: 796.4 Obsessive behavior     ICD-10-CM: R46.81 
ICD-9-CM: 300.3 Vitals BP Pulse Resp Height(growth percentile) Weight(growth percentile) SpO2  
 (!) 150/94 79 18 5' 2\" (1.575 m) 199 lb (90.3 kg) 96% BMI OB Status Smoking Status 36.4 kg/m2 Hysterectomy Current Every Day Smoker Vitals History BMI and BSA Data Body Mass Index Body Surface Area  
 36.4 kg/m 2 1.99 m 2 Preferred Pharmacy Pharmacy Name Phone [id number] [place] - [address] [facility] AT [address] [place] [address] [phone] Your Updated Medication List  
  
   
This list is accurate as of 8/3/18 10:45 AM.  Always use your most recent med list.  
  
  
  
  
 fluvoxaMINE 100 mg tablet Commonly known as:  Richard  Take on tab by mouth at bedtime for one week, then 1.5 tabs by mouth at bedtime for one week, then two tabs at bedtime  
  
 losartan 50 mg tablet Commonly known as:  COZAAR  
TAKE 1 TAB BY MOUTH DAILY. Prescriptions Sent to Pharmacy Refills  
 fluvoxaMINE (LUVOX) 100 mg tablet 5 Sig: Take on tab by mouth at bedtime for one week, then 1.5 tabs by mouth at bedtime for one week, then two tabs at bedtime Class: Normal  
 Pharmacy: [address] [place] [address], [place]. Carmelo Goldsmith [address]  #: [phone] We Performed the Following REFERRAL TO NEUROPSYCHOLOGY [AZW47 Custom] Comments:  
 Evaluate for FTD, other atypical dementia. Serious safety concerns, patient living alone and driving Referral Information Referral ID Referred By Referred To  
  
 [id number] Devorah PIERCE [address] [place] [address] [place] [address] [facility], [address] [facility] Phone: [phone] Fax: [phone] Visits Status Start Date End Date 1 New Request 8/3/18 8/3/19 If your referral has a status of pending review or denied, additional information will be sent to support the outcome of this decision. Patient Instructions Fluvoxamine (Luvox) 100 mg tabs: Take 1 tab at bedtime for 1 week If tolerated, increase to 1.5 tabs at bedtime for 1 week If tolerated increase to 2 tabs at bedtime Try to stay on this for at least a month to see if it helps with nail biting A Healthy Lifestyle: Care Instructions Your Care Instructions A healthy lifestyle can help you feel good, stay at a healthy weight, and have plenty of energy for both work and play. A healthy lifestyle is something you can share with your whole family. A healthy lifestyle also can lower your risk for serious health problems, such as high blood pressure, heart disease, and diabetes. You can follow a few steps listed below to improve your health and the health of your family. Follow-up care is a key part of your treatment and safety. Be sure to make and go to all appointments, and call your doctor if you are having problems. It's also a good idea to know your test results and keep a list of the medicines you take. How can you care for yourself at home? · Do not eat too much sugar, fat, or fast foods. You can still have dessert and treats now and then. The goal is moderation. · Start small to improve your eating habits. Pay attention to portion sizes, drink less juice and soda pop, and eat more fruits and vegetables. ¨ Eat a healthy amount of food. A 3-ounce serving of meat, for example, is about the size of a deck of cards. Fill the rest of your plate with vegetables and whole grains. ¨ Limit the amount of soda and sports drinks you have every day. Drink more water when you are thirsty. ¨ Eat at least 5 servings of fruits and vegetables every day. It may seem like a lot, but it is not hard to reach this goal. A serving or helping is 1 piece of fruit, 1 cup of vegetables, or 2 cups of leafy, raw vegetables. Have an apple or some carrot sticks as an afternoon snack instead of a candy bar. Try to have fruits and/or vegetables at every meal. 
· Make exercise part of your daily routine. You may want to start with simple activities, such as walking, bicycling, or slow swimming.  Try to be active 30 to 60 minutes every day. You do not need to do all 30 to 60 minutes all at once. For example, you can exercise 3 times a day for 10 or 20 minutes. Moderate exercise is safe for most people, but it is always a good idea to talk to your doctor before starting an exercise program. 
· Keep moving. [place] the lawn, work in the garden, or [employer]. Take the stairs instead of the elevator at work. · If you smoke, quit. People who smoke have an increased risk for heart attack, stroke, cancer, and other lung illnesses. Quitting is hard, but there are ways to boost your chance of quitting tobacco for good. ¨ Use nicotine gum, patches, or lozenges. ¨ Ask your doctor about stop-smoking programs and medicines. ¨ Keep trying. In addition to reducing your risk of diseases in the future, you will notice some benefits soon after you stop using tobacco. If you have shortness of breath or asthma symptoms, they will likely get better within a few weeks after you quit. · Limit how much alcohol you drink. Moderate amounts of alcohol (up to 2 drinks a day for men, 1 drink a day for women) are okay. But drinking too much can lead to liver problems, high blood pressure, and other health problems. Family health If you have a family, there are many things you can do together to improve your health. · Eat meals together as a family as often as possible. · Eat healthy foods. This includes fruits, vegetables, lean meats and dairy, and whole grains. · Include your family in your fitness plan. Most people think of activities such as jogging or tennis as the way to fitness, but there are many ways you and your family can be more active. Anything that makes you breathe hard and gets your heart pumping is exercise. Here are some tips: 
¨ Walk to do errands or to take your child to school or the bus. ¨ Go for a family bike ride after dinner instead of watching TV. Where can you learn more? Go to http://jazzy-jordy.info/. Enter F562 in the search box to learn more about \"A Healthy Lifestyle: Care Instructions. \" Current as of: December 7, 2017 Content Version: 11.7 © 0056-5090 [employer]. Care instructions adapted under license by [employer] (which disclaims liability or warranty for this information). If you have questions about a medical condition or this instruction, always ask your healthcare professional. [place] [address] any warranty or liability for your use of this information. Fluvoxamine (Luvox CR) - (By mouth) Why this medicine is used:  
Treats obsessive-compulsive disorder (OCD). Contact a nurse or doctor right away if you have: · Blistering, peeling, red skin rash · Fast, pounding, or uneven heartbeat · Unusual behavior, thoughts of hurting yourself or others, panic, trouble sleeping · Worsening depression · Anxiety, restlessness, seeing or hearing things that are not there · Confusion, weakness, and muscle twitching, seizures, eye pain, vision changes · Headache, trouble concentrating, memory problems, fever · Nausea, vomiting, diarrhea, constipation, loss of appetite, weight loss Common side effects: 
· Sexual problems © 2017 [address]  Information is for End User's use only and may not be sold, redistributed or otherwise used for commercial purposes. Introducing hospitals & HEALTH SERVICES! [facility] introduces E-Sign patient portal. Now you can access parts of your medical record, email your doctor's office, and request medication refills online. 1. In your internet browser, go to [email or web address] 2. Click on the First Time User? Click Here link in the Sign In box. You will see the New Member Sign Up page. 3. Enter your E-Sign Access Code exactly as it appears below. You will not need to use this code after youve completed the sign-up process.  If you do not sign up before the expiration date, you must request a new code. · [employer] Access Code: [email or web address] Expires: 11/1/2018  9:43 AM 
 
4. Enter the last four digits of your Social Security Number (xxxx) and Date of Birth (mm/dd/yyyy) as indicated and click Submit. You will be taken to the next sign-up page. 5. Create a [employer] ID. This will be your [employer] login ID and cannot be changed, so think of one that is secure and easy to remember. 6. Create a [employer] password. You can change your password at any time. 7. Enter your Password Reset Question and Answer. This can be used at a later time if you forget your password. 8. Enter your e-mail address. You will receive e-mail notification when new information is available in [address] 9. Click Sign Up. You can now view and download portions of your medical record. 10. Click the Download Summary menu link to download a portable copy of your medical information. If you have questions, please visit the Frequently Asked Questions section of the [employer] website. Remember, [employer] is NOT to be used for urgent needs. For medical emergencies, dial 911. Now available from your iPhone and Android! Please provide this summary of care documentation to your next provider. Your primary care clinician is listed as [id number] Darling IV. If you have any questions after today's visit, please call [phone].

## 2018-08-03 NOTE — PROGRESS NOTES
Date:            18     Name:  Sugey De La O  :  [id number]  MRN:  [id number]     PCP:  Tanya Vickers MD    Chief Complaint   Patient presents with    Memory Loss         HISTORY OF PRESENT ILLNESS:  Krishna Newell is a 70 y.o., female who presents today for follow up for memory impairment. Daughter is with her, providing most of her history. Daughter reports that about 2.5 years ago, patient's personality changed. She did have a brain MRI around then that showed mild white matter disease. Saw Iliana Nolasco last year who completed MMSE and felt it was not consistent with dementia and patient told to follow up PRN. Patient was alone at that appointment, but daughter has much more history to provide today. Patient is less opinionated, milder, less engaged, less put together in appearance. She is incontinent of stool and urine and not aware, does not seem bothered when it is pointed it to her. Memory is great, but reasoning and judgement are off. Had an episode a few years ago where she could not sleep for 48 hours, took extra xanax or ativan from PCP but then got nervous and called a friend. Ended up admitted at [facility]. She has gone downhill since then progressively. When daughter finally saw the patient's house, it was extremely messy. It took a the patient's daughter a week to clean it up. She needs prompts to bathe, dress properly, keep in her dentures. She is only on one medication, manages her own pills. PCP put her on Zoloft for anxiety, nail biting but it did not help and has been stopped. She will walk towards people in the store inappropriately as if she has no concept of the personal space. She has had some outbursts, cursing. Exercise tolerance is down, she smokes heavily. She is driving. She lives alone in a condo that she owns, mostly just stays up all night smoking and watching TV and sleeps all day. Patient reports that she feels fine. She denies trouble with memory. Reports that she is able to remember names, appointments, no trouble driving to places she is familiar with. She sleeps well. Feels rested when she wakes up, does not think that she snores. She plays cards one a week to stay busy, she admits that she stays up late watching tv and sleeps late. She does not read, mostly watches TV. She still cooks for herself. Denies appetite changes, taste changes but daughter reports that she is eating a lot more candy. Patient reports that she eats candy to keep herself from biting her nails. This is a new problem. She feels mood is stable, did have severe depression in her 35s and got E[facility] at [facility]. 8.21.2017 recap  Olga Israel is a 78 y.o. right handed female presenting for evaluation of memory impairment.       Onset and progression: Last 6 months, family reporting memory deficits, patient denies.       Neuropsychiatric symptoms                           Problems with judgment:No                        Reduced interest in hobbies/activities: No                        Repeats questions, stories, or statements: No                                            Trouble recalling people's names: No                        Trouble learning how to use a tool or appliance: No                        Forgetting the correct month or year: No                        Difficulty handling financial affairs (bill-paying, taxes):  No                        Difficulty remembering appointments:No     Memory: Short term recall- she feels this may be age related  Language:  No word finding difficulty  Change in personality:None  Change in eating habits:None  Physical changes: Denies new focal deficits  Depressive symptoms: treated for depression  Hallucinations/Delusions: None     Ability to function:  Driving: independent, no MVAs or difficulty with navigation  Finances: self controlled, no issues  Cooking:yes, no issues  Manages own medication: yes, not missing doses     Prior work-up: None     Prior treatments: None     Current Outpatient Prescriptions   Medication Sig    losartan (COZAAR) 50 mg tablet TAKE 1 TAB BY MOUTH DAILY. No current facility-administered medications for this visit. Allergies   Allergen Reactions    Lexapro [Escitalopram] Other (comments)     Strange thoughts    Other Medication Other (comments)     Distant past : unable to take some un named anti depressants due to mild side effects. Past Medical History:   Diagnosis Date    Blood in urine     Cancer ([address]     skin : Squamous cell ca. x 2    Hypertension     Microhematuria 2011    negative work up . Dr. Chantale Gilliland Overdose of benzodiazepine 12/12/2016    14 Lorazepam , hosp at [facility]     Pancreatic cyst      Past Surgical History:   Procedure Laterality Date    COLONOSCOPY N/A 12/8/2017    COLONOSCOPY performed by Litzy Whittington MD at [address] [facility] HX CHOLECYSTECTOMY      HX COLONOSCOPY  11/19/14    polypectomies, f/u 3 Y, Dr. Lisbeth Perkins      2011    HX MOHS PROCEDURES  06/19/2017    SCC right nasal bridge by Dr. Lyndsey Carranza    has rods in back    HX TONSILLECTOMY      HX UROLOGICAL      CYSTOSCOPY     Social History     Social History    Marital status:      Spouse name: N/A    Number of children: N/A    Years of education: N/A     Occupational History    Not on file.      Social History Main Topics    Smoking status: Current Every Day Smoker     Packs/day: 1.00    Smokeless tobacco: Never Used    Alcohol use No    Drug use: No    Sexual activity: Not on file     Other Topics Concern    Not on file     Social History Narrative     Family History   Problem Relation Age of Onset    Lung Disease Mother      emphysema    Heart Disease Mother     Cancer Father      lung         PHYSICAL EXAMINATION:    Visit Vitals    BP (!) 150/94    Pulse 79    Resp 18    Ht 5' 2\" (1.575 m)    Wt 90.3 kg (199 lb)    SpO2 96%    BMI 36.4 kg/m2 General:  Well defined, nourished, and groomed individual in no acute distress. Neck: Supple, nontender, no bruits, no pain with resistance to active range of motion. Heart: Regular rate and rhythm, no murmurs, rub, or gallop. Normal S1S2. Lungs:  Clear to auscultation bilaterally with equal chest expansion, no cough, no wheeze  Musculoskeletal:  Extremities revealed no edema and had full range of motion of joints. Psych:  Good mood and bright affect    NEUROLOGICAL EXAMINATION:     Mental Status:   Alert and oriented to self, year, location, president. Banana and an orange are fruit, watch and a ruler tell you information. Speech is clear and appropriate. Cranial Nerves:    II, III, IV, VI:  Visual acuity grossly intact. Pupils are equal, round, and reactive to light. Extra-ocular movements are full and fluid. No ptosis or nystagmus. V-XII: Hearing is grossly intact. Facial features are symmetric, with normal sensation and strength. The palate rises symmetrically and the tongue protrudes midline. Sternocleidomastoids 5/5. Motor Examination: Normal tone, bulk, and strength, 5/5 muscle strength throughout. No cogwheeling  Coordination:  Finger to nose testing was normal.   No resting or intention tremor  Gait and Station:  Steady while walking. Normal arm swing. No pronator drift. No muscle wasting or fasciculations noted. ASSESSMENT AND PLAN    ICD-10-CM ICD-9-CM    1. Abnormal behavior R46.89 796.4 REFERRAL TO NEUROPSYCHOLOGY   2. Obsessive behavior R46.81 300.3 fluvoxaMINE (LUVOX) 100 mg tablet     42-year-old female seen in follow-up for behavioral changes. No real memory issues. She was seen by Dr. Carline Zambrano last year, patient was alone and denied new problems. Patient's daughter is with her today and reports a significant amount of changes over the past 2.5 years.   House has become very messy, patient is unkempt, she is incontinent and not concerned about that, she started compulsively biting her nails, chews candy all day to keep herself from doing that. Exam is  unremarkable. My concern is for frontotemporal dementia, and patient is living alone and driving so I have some serious safety concerns there. 1.  Referral to Dr. Celia Day for neuropsychological evaluation, would appreciate recommendations regarding driving, living independently based on results  2. We will start fluvoxamine for obsessive behavior, start with 100 mg nightly and titrate 200 mg nightly if tolerated. If she fails that, will refer to geropsychiatry but I am not sure how receptive she would be to see a psychiatrist    Follow-up after neuropsych, call sooner with concerns        Rayshawn Guillen NP    This note was created using voice recognition software. Despite editing, there may be syntax errors.

## 2018-08-03 NOTE — PATIENT INSTRUCTIONS
Fluvoxamine (Luvox) 100 mg tabs: Take 1 tab at bedtime for 1 week  If tolerated, increase to 1.5 tabs at bedtime for 1 week  If tolerated increase to 2 tabs at bedtime    Try to stay on this for at least a month to see if it helps with nail biting         A Healthy Lifestyle: Care Instructions  Your Care Instructions    A healthy lifestyle can help you feel good, stay at a healthy weight, and have plenty of energy for both work and play. A healthy lifestyle is something you can share with your whole family. A healthy lifestyle also can lower your risk for serious health problems, such as high blood pressure, heart disease, and diabetes. You can follow a few steps listed below to improve your health and the health of your family. Follow-up care is a key part of your treatment and safety. Be sure to make and go to all appointments, and call your doctor if you are having problems. It's also a good idea to know your test results and keep a list of the medicines you take. How can you care for yourself at home? · Do not eat too much sugar, fat, or fast foods. You can still have dessert and treats now and then. The goal is moderation. · Start small to improve your eating habits. Pay attention to portion sizes, drink less juice and soda pop, and eat more fruits and vegetables. ¨ Eat a healthy amount of food. A 3-ounce serving of meat, for example, is about the size of a deck of cards. Fill the rest of your plate with vegetables and whole grains. ¨ Limit the amount of soda and sports drinks you have every day. Drink more water when you are thirsty. ¨ Eat at least 5 servings of fruits and vegetables every day. It may seem like a lot, but it is not hard to reach this goal. A serving or helping is 1 piece of fruit, 1 cup of vegetables, or 2 cups of leafy, raw vegetables. Have an apple or some carrot sticks as an afternoon snack instead of a candy bar.  Try to have fruits and/or vegetables at every meal.  · Make exercise part of your daily routine. You may want to start with simple activities, such as walking, bicycling, or slow swimming. Try to be active 30 to 60 minutes every day. You do not need to do all 30 to 60 minutes all at once. For example, you can exercise 3 times a day for 10 or 20 minutes. Moderate exercise is safe for most people, but it is always a good idea to talk to your doctor before starting an exercise program.  · Keep moving. Elizabeth  the lawn, work in the garden, or [employer] Take the stairs instead of the elevator at work. · If you smoke, quit. People who smoke have an increased risk for heart attack, stroke, cancer, and other lung illnesses. Quitting is hard, but there are ways to boost your chance of quitting tobacco for good. ¨ Use nicotine gum, patches, or lozenges. ¨ Ask your doctor about stop-smoking programs and medicines. ¨ Keep trying. In addition to reducing your risk of diseases in the future, you will notice some benefits soon after you stop using tobacco. If you have shortness of breath or asthma symptoms, they will likely get better within a few weeks after you quit. · Limit how much alcohol you drink. Moderate amounts of alcohol (up to 2 drinks a day for men, 1 drink a day for women) are okay. But drinking too much can lead to liver problems, high blood pressure, and other health problems. Family health  If you have a family, there are many things you can do together to improve your health. · Eat meals together as a family as often as possible. · Eat healthy foods. This includes fruits, vegetables, lean meats and dairy, and whole grains. · Include your family in your fitness plan. Most people think of activities such as jogging or tennis as the way to fitness, but there are many ways you and your family can be more active. Anything that makes you breathe hard and gets your heart pumping is exercise.  Here are some tips:  ¨ Walk to do errands or to take your child to school or the bus. ¨ Go for a family bike ride after dinner instead of watching TV. Where can you learn more? Go to http://jazzy-jordy.info/. Enter N431 in the search box to learn more about \"A Healthy Lifestyle: Care Instructions. \"  Current as of: December 7, 2017  Content Version: 11.7  © 20067519-4636 [employer]. Care instructions adapted under license by [employer] (which disclaims liability or warranty for this information). If you have questions about a medical condition or this instruction, always ask your healthcare professional. [place] [address] any warranty or liability for your use of this information. Fluvoxamine (Luvox CR) - (By mouth)   Why this medicine is used:   Treats obsessive-compulsive disorder (OCD). Contact a nurse or doctor right away if you have:  · Blistering, peeling, red skin rash  · Fast, pounding, or uneven heartbeat  · Unusual behavior, thoughts of hurting yourself or others, panic, trouble sleeping  · Worsening depression  · Anxiety, restlessness, seeing or hearing things that are not there  · Confusion, weakness, and muscle twitching, seizures, eye pain, vision changes  · Headache, trouble concentrating, memory problems, fever  · Nausea, vomiting, diarrhea, constipation, loss of appetite, weight loss     Common side effects:  · Sexual problems  © 2017 [address] is for End User's use only and may not be sold, redistributed or otherwise used for commercial purposes.

## 2018-08-06 ENCOUNTER — TELEPHONE (OUTPATIENT)
Dept: NEUROLOGY | Age: 78
End: 2018-08-06

## 2018-08-06 NOTE — TELEPHONE ENCOUNTER
I have not finished her note or sent him a message. I will do it now. September is actually pretty fasty, I think that is fine.

## 2018-08-06 NOTE — TELEPHONE ENCOUNTER
Did Rosas Leal have any luck getting pt in to see Dr. Maurilio Davenport in for a sooner appt. ? Please call back.

## 2018-08-06 NOTE — TELEPHONE ENCOUNTER
Spoke with patient's daughter, Early Poli (on HIPAA) and informed her that her mother is actually scheduled on 9/12/18. Advised to arrived at least 30 minutes early and provided her with the office address. She voiced understanding.

## 2018-09-12 ENCOUNTER — OFFICE VISIT (OUTPATIENT)
Dept: NEUROLOGY | Age: 78
End: 2018-09-12

## 2018-09-12 DIAGNOSIS — R41.89 COGNITIVE AND BEHAVIORAL CHANGES: ICD-10-CM

## 2018-09-12 DIAGNOSIS — F32.A CHRONIC DEPRESSION: ICD-10-CM

## 2018-09-12 DIAGNOSIS — G31.84 MILD COGNITIVE IMPAIRMENT: Primary | ICD-10-CM

## 2018-09-12 DIAGNOSIS — R46.89 ABNORMAL BEHAVIOR: ICD-10-CM

## 2018-09-12 DIAGNOSIS — R46.89 COGNITIVE AND BEHAVIORAL CHANGES: ICD-10-CM

## 2018-09-12 DIAGNOSIS — Z86.59 HISTORY OF ANXIETY: ICD-10-CM

## 2018-09-12 DIAGNOSIS — R46.81 OBSESSIVE BEHAVIOR: ICD-10-CM

## 2018-09-12 DIAGNOSIS — Z98.890 HISTORY OF ELECTROCONVULSIVE THERAPY: ICD-10-CM

## 2018-09-12 NOTE — PROGRESS NOTES
[address] [facility],[address]  [address]   [place] [address] [facility] [address] [facility] [address]   [phone] Office   [phone] Fax      Neuropsychology    Initial Diagnostic Interview Note      Referral:  Jarvis Voss MD, Izyz Lin NP    Trung Virgen is a 70 y.o. right handed   female who was accompanied by her daughter to the initial clinical interview on 9/12/18 . Please refer to her medical records for details pertaining to her history. Briefly, the patient reported that she completed one year of college without history of previously diagnosed LD and/or receipt of special education. She reports her memory is fine and she lives. She reports being independent for driving, medications, finances, day-to-day chores, ADLs. No history of previously diagnosed stroke, meningitis/encephalitis, ELLE Fever, Lupus, Lyme, TBI, sz, etc.  No sleep or appetite issues. No vision or hearing issues. No known family history of dementia. Per the daughter, there are concerns about marked personality and behavioral changes and executive functioning issues and judgment and reasoning concerns. She is not taking care of her hygiene as she should. She is not bathing, showing, cleaning her clothes,things like that. There are concerns about behavioral changes also. Goes to dinner once a week and plays cards and otherwise watches tv and smokes all day. She had a MRI which showed white matter disease. She is less engaged. She is incontinent of bowels and bladder and either not aware or not bothered by this. She has been on Zoloft for anxiety. Her TV had been cut off, and went to neighbor to pay bill online and service was restored. From Ms. Law's notes: Som Tilley is a 70 y.o., female who presents today for follow up for memory impairment. Daughter is with her, providing most of her history.   Daughter reports that about 2.5 years ago, patient's personality changed. She did have a brain MRI around then that showed mild white matter disease. Saw Jeanell Dancer last year who completed MMSE and felt it was not consistent with dementia and patient told to follow up PRN. Patient was alone at that appointment, but daughter has much more history to provide today. Patient is less opinionated, milder, less engaged, less put together in appearance. She is incontinent of stool and urine and not aware, does not seem bothered when it is pointed it to her. Memory is great, but reasoning and judgement are off. Had an episode a few years ago where she could not sleep for 48 hours, took extra xanax or ativan from PCP but then got nervous and called a friend. Ended up admitted at [facility]. She has gone downhill since then progressively. When daughter finally saw the patient's house, it was extremely messy. It took a the patient's daughter a week to clean it up. She needs prompts to bathe, dress properly, keep in her dentures. She is only on one medication, manages her own pills. PCP put her on Zoloft for anxiety, nail biting but it did not help and has been stopped. She will walk towards people in the store inappropriately as if she has no concept of the personal space. She has had some outbursts, cursing. Exercise tolerance is down, she smokes heavily. She is driving. She lives alone in a condo that she owns, mostly just stays up all night smoking and watching TV and sleeps all day.      Patient reports that she feels fine. She denies trouble with memory. Reports that she is able to remember names, appointments, no trouble driving to places she is familiar with. She sleeps well. Feels rested when she wakes up, does not think that she snores. She plays cards one a week to stay busy, she admits that she stays up late watching tv and sleeps late. She does not read, mostly watches TV. She still cooks for herself.  Denies appetite changes, taste changes but daughter reports that she is eating a lot more candy. Patient reports that she eats candy to keep herself from biting her nails. This is a new problem. She feels mood is stable, did have severe depression in her 35s and got ECT at [facility]. \"      No previous neuropsych testing. Neuropsychological Mental Status Exam (NMSE):  Historian: Good  Praxis: No UE apraxia  R/L Orientation: Intact to self and to other  Dress: within normal limits   Weight: within normal limits   Appearance/Hygiene: within normal limits   Gait: within normal limits   Assistive Devices: None  Mood: Flat  Affect: Flat   Comprehension: within normal limits   Thought Process: within normal limits   Expressive Language: within normal limits   Receptive Language: within normal limits   Motor:  No cognitive or motor perseveration  ETOH: Denied  Tobacco: 1ppd x since age 18. Counseling to quit given  Illicit: Denied  SI/HI: Denied  Psychosis: Denied  Insight: Within normal limits  Judgment: Within normal limits  Other Psych:      Past Medical History:   Diagnosis Date    Blood in urine     [address] [address]     skin : Squamous cell ca. x 2    Hypertension     Microhematuria 2011    negative work up .  Dr. Marley Fonseca Overdose of benzodiazepine 12/12/2016    14 Lorazepam , hosp at [facility] Wiliam Pointer     Pancreatic cyst        Past Surgical History:   Procedure Laterality Date    COLONOSCOPY N/A 12/8/2017    COLONOSCOPY performed by Arleth Wakefield MD at [address] [facility] HX CHOLECYSTECTOMY      HX COLONOSCOPY  11/19/14    polypectomies, f/u 3 Y, Dr. Laura Ray      2011    HX MOHS PROCEDURES  06/19/2017    SCC right nasal bridge by Dr. Ish Johnson    has rods in back    HX TONSILLECTOMY      HX UROLOGICAL      CYSTOSCOPY       Allergies   Allergen Reactions    Lexapro [Escitalopram] Other (comments)     Strange thoughts    Other Medication Other (comments)     Distant past : unable to take some un named anti depressants due to mild side effects. Family History   Problem Relation Age of Onset    Lung Disease Mother      emphysema    Heart Disease Mother     Cancer Father      lung       Social History   Substance Use Topics    Smoking status: Current Every Day Smoker     Packs/day: 1.00    Smokeless tobacco: Never Used    Alcohol use No       Current Outpatient Prescriptions   Medication Sig Dispense Refill    fluvoxaMINE (LUVOX) 100 mg tablet Take on tab by mouth at bedtime for one week, then 1.5 tabs by mouth at bedtime for one week, then two tabs at bedtime 60 Tab 5    losartan (COZAAR) 50 mg tablet TAKE 1 TAB BY MOUTH DAILY. 90 Tab 3         Plan:  Obtain authorization for testing from [employer]. Report to follow once testing, scoring, and interpretation completed. ? Organic based neurocognitive issues versus mood disorder or combination of same. ? Problems organic, functional, or both? This note will not be viewable in [address] 70year old with past history of ECT and treated successfully from an anxiety/depression standpoint and for the past three years has had personality and behaviuoral changes and there are concerns about FTD versus other dementing type issue, mood, or otherwise. Will evaluate for organic based cognitive changes versus mood (organic versus functional) or combination of same?

## 2018-10-09 ENCOUNTER — OFFICE VISIT (OUTPATIENT)
Dept: NEUROLOGY | Age: 78
End: 2018-10-09

## 2018-10-09 DIAGNOSIS — R41.9 COGNITIVE COMPLAINTS WITH NORMAL NEUROPSYCHOLOGICAL EXAM: ICD-10-CM

## 2018-10-09 DIAGNOSIS — Z98.890 HISTORY OF ELECTROCONVULSIVE THERAPY: ICD-10-CM

## 2018-10-09 DIAGNOSIS — R46.89 ABNORMAL BEHAVIOR: ICD-10-CM

## 2018-10-09 DIAGNOSIS — R46.81 OBSESSIVE BEHAVIOR: ICD-10-CM

## 2018-10-09 DIAGNOSIS — Z86.59 HISTORY OF ANXIETY: ICD-10-CM

## 2018-10-09 DIAGNOSIS — F32.A CHRONIC DEPRESSION: ICD-10-CM

## 2018-10-09 DIAGNOSIS — G31.84 MILD COGNITIVE IMPAIRMENT: Primary | ICD-10-CM

## 2018-10-12 NOTE — PROGRESS NOTES
[address] [facility],[address]  [address]   [place] [address] [facility] [address] [place] [address] [address]   [phone] Office   [phone] Fax      Neuropsychological Evaluation Report  Referral:  Ky Camara MD, Mariangel Carranza, JING    Ernestina Catherine is a 70 y.o. right handed   female who was accompanied by her daughter to the initial clinical interview on 9/12/18 . Please refer to her medical records for details pertaining to her history. Briefly, the patient reported that she completed one year of college without history of previously diagnosed LD and/or receipt of special education. She reports her memory is fine and she lives. She reports being independent for driving, medications, finances, day-to-day chores, ADLs. No history of previously diagnosed stroke, meningitis/encephalitis, ELLE Fever, Lupus, Lyme, TBI, sz, etc.  No sleep or appetite issues. No vision or hearing issues. No known family history of dementia. Per the daughter, there are concerns about marked personality and behavioral changes and executive functioning issues and judgment and reasoning concerns. She is not taking care of her hygiene as she should. She is not bathing, showing, cleaning her clothes,things like that. There are concerns about behavioral changes also. Goes to dinner once a week and plays cards and otherwise watches tv and smokes all day. She had a MRI which showed white matter disease. She is less engaged. She is incontinent of bowels and bladder and either not aware or not bothered by this. She has been on Zoloft for anxiety. Her TV had been cut off, and went to neighbor to pay bill online and service was restored. From Ms. Law's notes: Sheela Muir is a 70 y.o., female who presents today for follow up for memory impairment. Daughter is with her, providing most of her history.   Daughter reports that about 2.5 years ago, patient's personality changed. She did have a brain MRI around then that showed mild white matter disease. Saw Vamsi Bailey last year who completed MMSE and felt it was not consistent with dementia and patient told to follow up PRN. Patient was alone at that appointment, but daughter has much more history to provide today. Patient is less opinionated, milder, less engaged, less put together in appearance. She is incontinent of stool and urine and not aware, does not seem bothered when it is pointed it to her. Memory is great, but reasoning and judgement are off. Had an episode a few years ago where she could not sleep for 48 hours, took extra xanax or ativan from PCP but then got nervous and called a friend. Ended up admitted at [facility]. She has gone downhill since then progressively. When daughter finally saw the patient's house, it was extremely messy. It took a the patient's daughter a week to clean it up. She needs prompts to bathe, dress properly, keep in her dentures. She is only on one medication, manages her own pills. PCP put her on Zoloft for anxiety, nail biting but it did not help and has been stopped. She will walk towards people in the store inappropriately as if she has no concept of the personal space. She has had some outbursts, cursing. Exercise tolerance is down, she smokes heavily. She is driving. She lives alone in a condo that she owns, mostly just stays up all night smoking and watching TV and sleeps all day.      Patient reports that she feels fine. She denies trouble with memory. Reports that she is able to remember names, appointments, no trouble driving to places she is familiar with. She sleeps well. Feels rested when she wakes up, does not think that she snores. She plays cards one a week to stay busy, she admits that she stays up late watching tv and sleeps late. She does not read, mostly watches TV. She still cooks for herself.  Denies appetite changes, taste changes but daughter reports that she is eating a lot more candy. Patient reports that she eats candy to keep herself from biting her nails. This is a new problem. She feels mood is stable, did have severe depression in her 35s and got ECT at [facility]. \"      No previous neuropsych testing. Neuropsychological Mental Status Exam (NMSE):  Historian: Good  Praxis: No UE apraxia  R/L Orientation: Intact to self and to other  Dress: within normal limits   Weight: within normal limits   Appearance/Hygiene: within normal limits   Gait: within normal limits   Assistive Devices: None  Mood: Flat  Affect: Flat   Comprehension: within normal limits   Thought Process: within normal limits   Expressive Language: within normal limits   Receptive Language: within normal limits   Motor:  No cognitive or motor perseveration  ETOH: Denied  Tobacco: 1ppd x since age 18. Counseling to quit given  Illicit: Denied  SI/HI: Denied  Psychosis: Denied  Insight: Within normal limits  Judgment: Within normal limits  Other Psych:      Past Medical History:   Diagnosis Date    Blood in urine     Cancer ([facility] [address]     skin : Squamous cell ca. x 2    Hypertension     Microhematuria 2011    negative work up .  Dr. Mauro Nelson Overdose of benzodiazepine 12/12/2016    14 Lorazepam , hosp at [address] [facility] [address] [place]     Pancreatic cyst        Past Surgical History:   Procedure Laterality Date    COLONOSCOPY N/A 12/8/2017    COLONOSCOPY performed by Jud De La Cruz MD at [address] [facility] HX CHOLECYSTECTOMY      HX COLONOSCOPY  11/19/14    polypectomies, f/u 3 Y, Dr. Noelle Moreno      2011    HX MOHS PROCEDURES  06/19/2017    SCC right nasal bridge by Dr. Sotelo Ink    has rods in back    HX TONSILLECTOMY      HX UROLOGICAL      CYSTOSCOPY       Allergies   Allergen Reactions    Lexapro [Escitalopram] Other (comments)     Strange thoughts    Other Medication Other (comments)     Distant past : unable to take some un named anti depressants due to mild side effects. Family History   Problem Relation Age of Onset    Lung Disease Mother      emphysema    Heart Disease Mother     Cancer Father      lung       Social History   Substance Use Topics    Smoking status: Current Every Day Smoker     Packs/day: 1.00    Smokeless tobacco: Never Used    Alcohol use No       Current Outpatient Prescriptions   Medication Sig Dispense Refill    fluvoxaMINE (LUVOX) 100 mg tablet Take on tab by mouth at bedtime for one week, then 1.5 tabs by mouth at bedtime for one week, then two tabs at bedtime 60 Tab 5    losartan (COZAAR) 50 mg tablet TAKE 1 TAB BY MOUTH DAILY. 90 Tab 3         Plan:  Obtain authorization for testing from [employer]. Report to follow once testing, scoring, and interpretation completed. ? Organic based neurocognitive issues versus mood disorder or combination of same. ? Problems organic, functional, or both? This note will not be viewable in [address] 70year old with past history of ECT and treated successfully from an anxiety/depression standpoint and for the past three years has had personality and behaviuoral changes and there are concerns about FTD versus other dementing type issue, mood, or otherwise. Will evaluate for organic based cognitive changes versus mood (organic versus functional) or combination of same?      Neuropsychological Test Results  Patient Testing 10/9/18 Report Completed 10/12/18  A Psychometrist Assisted w/ portions of this evaluation while under my direct  supervision    The following evaluation procedures/tests were administered:      Neuropsychologist Performed, Interpreted, & Reported:  Neuropsychological Mental Status Exam, Revised Memory & Behavior Checklist,  Mini Mental Status Exam, Clock Drawing Test, Yulia-Melzack Pain Questionnaire, Test Of Premorbid Functioning, History Taking  & Clinical Interview With The Patient, Additional History Taking w/ the Patient's Daughter, CASE, ABAS-3, Review Of Available Records. Psychometrist Administered under Neuropsychologist Supervision & Neuropsychologist Interpreted & Neuropsychologist Reported:  Verbal Fluency Tests, Elvin & Elvin - Revised, Trailmaking Test Parts A & B, Wechsler Adult Intelligence Scale - IV, Geovany Continuous Performance Test - III, [place] [facility] [employer] - 3, Grooved Pegboard, Kebede Depression Inventory - II, Kebede Anxiety Inventory, Personality Assessment Inventory. Test Findings:  Test Findings:  Note:  The patients raw data have been compared with currently available norms which include demographic corrections for age, gender, and/or education. Sometimes, the patients scores are compared to demographically similar individuals as close to the patients age, education level, etc., as possible. \"Average\" is viewed as being +/- 1 standard deviation (SD) from the stated mean for a particular test score. \"Low average\" is viewed as being between 1 and 2 SD below the mean, and above average is viewed as being 1 and 2 SD above the mean. Scores falling in the borderline range (between 1-1/2 and 2 SD below the mean) are viewed with particular attention as to whether they are normal or abnormal neurocognitive test scores. Other methods of inference in analyzing the test data are also utilized, including the pattern and range of scores in the profile, bilateral motor functions, and the presence, if any, of pathognomonic signs. Behaviorally, the patient was friendly and cooperative and appeared motivated to perform well during this examination. Within this context, the results of this evaluation are viewed as a valid reflection of the patients actual neurocognitive and emotional status. The patient's score of 30/30 was normal.  Clock drawing was within normal limits.         Her structured word list fluency, as assessed by the FAS Test, was within the above average range with a T score of 61. Category fluency was within the above average range with a T score of 62. Confrontation naming ability, as assessed by the [facility] - Revised, was within the average range at 53/60 correct (T = 49). This pattern of performance is not indicative of a patient who is at increased risk for day-to-day problems with verbal fluency or confrontation naming. The patient was administered the [facility] Continuous Performance Test - III and review of the subscales within this instrument did not reveal clinically significant concerns for inattentiveness or impulsivity. This pattern of performance is not indicative of a patient who is at increased risk for day-to-day problems with sustained visual attention/concentration. The patient is not showing problems with working memory capacity (63rd %ile) or processing speed (87th %ile) on the WAIS-IV. Her Verbal Comprehension Index score of 105 was within the average range . Her Perceptual Reasoning Index score of 105 was within the average range. These scores do not reflect a decline in functioning based on an assessment of premorbid functioning. The patient was administered the [place] Verbal Learning Test  - 3 and generated a normal to above normal range and positive learning curve over five repeated auditory word list learning trials. An interference trial was normal.  Free and cued, short and long delayed recall were all well within or above the normal range. Recognition recall was normal.   This pattern of performance is not indicative of a patient who is at increased risk for day-to-day problems with auditory learning and/or memory. Simple timed visual motor sequencing (Trailmaking Test Part A) was within the above average range with a T score of 55. Her performance on a similar, but more complex task of timed visual motor sequencing (Trailmaking Test Part B) was within the above average range with a T score of 63.    This pattern of performance is not indicative of a patient who is at increased risk for day-to-day problems with executive functioning. Fine motor dexterity was within the normal range bilaterally. This does not raise concern for a particularly lateralized brain dysfunction. The patient rated her current level of pain as \"0/5- No Pain\" on the Yulia-Melzack Pain Questionnaire. Her Kebede Depression Inventory -II score of 0 was within the minimally  depressed range. Her Kebede Anxiety Inventory score of 0 reflected minimal anxiety. The patient's responses on the Personality Assessment Inventory were deemed invalid for interpretation due to a very high level of patient defensiveness in terms of response style. The daughter completed the ABAS-3 and reported clinically significant concerns regarding the patient's general adaptive skills (1st  %ile), conceptual skills (1st %ile), social skills (2nd %ile), and practical skills (0.5th %ile). On the CASE, the daughter did not report clinically significant concerns regarding the patient's cognitive or emotional functioning. Impressions & Recommendations: This is a fully normal to above normal range Neuropsychological Evaluation with respect to neurocognitive functioning. In this regard, her performance across all neurocognitive domains assessed, including mental status, verbal fluency, confrontation naming, visual attention, auditory learning, auditory memory, processing speed, working memory, perceptual reasoning, verbal comprehension, bilateral fine motor dexterity, and executive functioning remain well within or above the normal range. There is no evidence of a decline from estimated premorbid functioning levels. She was biting her nails throughout testing and was clearly anxious during testing, and still did quite well on examination.   From a mood standpoint, she reported zero depression and zero anxiety despite a history of same and was so defensive in her response style on personality testing so as to invalidate that test.       Thankfully, there is no evidence of an organic based neurocognitive disorder here. Instead, this is likely age and ongoing mood concerns - the severity of which is currently unclear. I recommend psychiatric treatment for depression/anxiety and counseling as needed. I am not concerned about competency, driving, day-to-day supervision, etc., from a neurocognitive standpoint and find these test results to be very reassuring. She should be encouraged to remain as mentally, socially, and physically active as possible. Baseline now established. Follow up prn. Clinical correlation is, of course, indicated. I will discuss these findings with the patient when she follows up with me in the near future. A follow up Neuropsychological Evaluation is indicated on a prn basis, especially if there are any cognitive and/or emotional changes. DIAGNOSES:  The Referral Dx of [facility] - IS NOT SUPPORTED     Depression/Anxiety, Chronic, current severity unclear     The above information is based upon information currently available to me. If there is any additional information of which I am currently unaware, I would be more than happy to review it upon having it made available to me. Thank you for the opportunity to see this interesting individual.     Sincerely,       G[place]a [place]. Manju Aguayo, Pamella    CC:  Lizzette Murray MD , Amaris Russell NP    2 units -[id number]-  1.75 hours Record review. Review of history provided by patient. Review of collaborative information. Testing by Clinician. Review of raw data. Scoring. Report writing of individual tests administered by Clinician. Integration of individual tests administered by psychometrist (that were previously reported and billed under psychometry code below) with testing by clinician and review of records/history/collaborative information.   Case Conceptualization, Report writing. Coordination Of Care. 4 units  -11228 - 3.75 hours Psychometrist test prep, administration, and scoring under clinician's direct supervision. Clinical interpretation of individual tests administered by psychometrist .  Clinician report of individual tests administered by psychometrist.    \"Unit\" is defined by CPT/National Guidelines (31 - 60 minutes). Integral services including scoring of raw data, data interpretation, case conceptualization, report writing etcetera were initiated after the patient finished testing/raw data collected and was completed on the date the report was signed.

## 2018-11-08 ENCOUNTER — OFFICE VISIT (OUTPATIENT)
Dept: NEUROLOGY | Age: 78
End: 2018-11-08

## 2018-11-08 VITALS
BODY MASS INDEX: 37.73 KG/M2 | HEIGHT: 62 IN | WEIGHT: 205 LBS | DIASTOLIC BLOOD PRESSURE: 90 MMHG | HEART RATE: 104 BPM | RESPIRATION RATE: 20 BRPM | OXYGEN SATURATION: 94 % | SYSTOLIC BLOOD PRESSURE: 124 MMHG

## 2018-11-08 DIAGNOSIS — R46.89 BEHAVIORAL CHANGE: Primary | ICD-10-CM

## 2018-11-08 DIAGNOSIS — R46.0 DEFICIT OF PERSONAL BATHING AND HYGIENE: ICD-10-CM

## 2018-11-08 NOTE — PROGRESS NOTES
Ms. Lola Cary is here to follow Neuropsych testing. Her children are present at visit and report safety concerns in the home.
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
## 2018-11-08 NOTE — PATIENT INSTRUCTIONS
A Healthy Lifestyle: Care Instructions  Your Care Instructions    A healthy lifestyle can help you feel good, stay at a healthy weight, and have plenty of energy for both work and play. A healthy lifestyle is something you can share with your whole family. A healthy lifestyle also can lower your risk for serious health problems, such as high blood pressure, heart disease, and diabetes. You can follow a few steps listed below to improve your health and the health of your family. Follow-up care is a key part of your treatment and safety. Be sure to make and go to all appointments, and call your doctor if you are having problems. It's also a good idea to know your test results and keep a list of the medicines you take. How can you care for yourself at home? · Do not eat too much sugar, fat, or fast foods. You can still have dessert and treats now and then. The goal is moderation. · Start small to improve your eating habits. Pay attention to portion sizes, drink less juice and soda pop, and eat more fruits and vegetables. ? Eat a healthy amount of food. A 3-ounce serving of meat, for example, is about the size of a deck of cards. Fill the rest of your plate with vegetables and whole grains. ? Limit the amount of soda and sports drinks you have every day. Drink more water when you are thirsty. ? Eat at least 5 servings of fruits and vegetables every day. It may seem like a lot, but it is not hard to reach this goal. A serving or helping is 1 piece of fruit, 1 cup of vegetables, or 2 cups of leafy, raw vegetables. Have an apple or some carrot sticks as an afternoon snack instead of a candy bar. Try to have fruits and/or vegetables at every meal.  · Make exercise part of your daily routine. You may want to start with simple activities, such as walking, bicycling, or slow swimming. Try to be active 30 to 60 minutes every day. You do not need to do all 30 to 60 minutes all at once.  For example, you can exercise 3 times a day for 10 or 20 minutes. Moderate exercise is safe for most people, but it is always a good idea to talk to your doctor before starting an exercise program.  · Keep moving. Keyonna Griffinahan the lawn, work in the garden, or [employer]. Take the stairs instead of the elevator at work. · If you smoke, quit. People who smoke have an increased risk for heart attack, stroke, cancer, and other lung illnesses. Quitting is hard, but there are ways to boost your chance of quitting tobacco for good. ? Use nicotine gum, patches, or lozenges. ? Ask your doctor about stop-smoking programs and medicines. ? Keep trying. In addition to reducing your risk of diseases in the future, you will notice some benefits soon after you stop using tobacco. If you have shortness of breath or asthma symptoms, they will likely get better within a few weeks after you quit. · Limit how much alcohol you drink. Moderate amounts of alcohol (up to 2 drinks a day for men, 1 drink a day for women) are okay. But drinking too much can lead to liver problems, high blood pressure, and other health problems. Family health  If you have a family, there are many things you can do together to improve your health. · Eat meals together as a family as often as possible. · Eat healthy foods. This includes fruits, vegetables, lean meats and dairy, and whole grains. · Include your family in your fitness plan. Most people think of activities such as jogging or tennis as the way to fitness, but there are many ways you and your family can be more active. Anything that makes you breathe hard and gets your heart pumping is exercise. Here are some tips:  ? Walk to do errands or to take your child to school or the bus.  ? Go for a family bike ride after dinner instead of watching TV. Where can you learn more? Go to http://jazzy-jordy.info/. Enter R123 in the search box to learn more about \"A Healthy Lifestyle: Care Instructions. \"  Current as of: December 7, 2017  Content Version: 11.8  © 3146-2152 [employer], [employer]. Care instructions adapted under license by [employer] (which disclaims liability or warranty for this information). If you have questions about a medical condition or this instruction, always ask your healthcare professional. [address] any warranty or liability for your use of this information.

## 2018-11-09 ENCOUNTER — DOCUMENTATION ONLY (OUTPATIENT)
Dept: NEUROLOGY | Age: 78
End: 2018-11-09

## 2019-02-25 PROBLEM — E66.01 SEVERE OBESITY (HCC): Status: ACTIVE | Noted: 2019-02-25

## 2019-02-25 PROBLEM — E55.9 VITAMIN D DEFICIENCY: Status: ACTIVE | Noted: 2019-02-25

## 2019-07-15 ENCOUNTER — HOSPITAL ENCOUNTER (OUTPATIENT)
Dept: GENERAL RADIOLOGY | Age: 79
Discharge: HOME | End: 2019-07-15
Attending: INTERNAL MEDICINE
Payer: MEDICARE

## 2019-07-15 DIAGNOSIS — R05.9 COUGH: ICD-10-CM

## 2019-07-15 PROCEDURE — 71046 X-RAY EXAM CHEST 2 VIEWS: CPT

## 2021-07-09 ENCOUNTER — ANESTHESIA EVENT (OUTPATIENT)
Dept: ENDOSCOPY | Age: 81
End: 2021-07-09
Payer: MEDICARE

## 2021-07-09 ENCOUNTER — ANESTHESIA (OUTPATIENT)
Dept: ENDOSCOPY | Age: 81
End: 2021-07-09
Payer: MEDICARE

## 2021-07-09 ENCOUNTER — HOSPITAL ENCOUNTER (OUTPATIENT)
Age: 81
Setting detail: OUTPATIENT SURGERY
Discharge: HOME | End: 2021-07-09
Attending: INTERNAL MEDICINE | Admitting: INTERNAL MEDICINE
Payer: MEDICARE

## 2021-07-09 VITALS
WEIGHT: 212.52 LBS | HEIGHT: 63 IN | SYSTOLIC BLOOD PRESSURE: 136 MMHG | HEART RATE: 81 BPM | RESPIRATION RATE: 21 BRPM | BODY MASS INDEX: 37.66 KG/M2 | OXYGEN SATURATION: 98 % | DIASTOLIC BLOOD PRESSURE: 65 MMHG | TEMPERATURE: 97.8 F

## 2021-07-09 PROCEDURE — 74011250636 HC RX REV CODE- 250/636: Performed by: REGISTERED NURSE

## 2021-07-09 PROCEDURE — 77030013992 HC SNR POLYP ENDOSC BSC -B: Performed by: INTERNAL MEDICINE

## 2021-07-09 PROCEDURE — 76040000019: Performed by: INTERNAL MEDICINE

## 2021-07-09 PROCEDURE — 74011000250 HC RX REV CODE- 250: Performed by: REGISTERED NURSE

## 2021-07-09 PROCEDURE — 76060000031 HC ANESTHESIA FIRST 0.5 HR: Performed by: INTERNAL MEDICINE

## 2021-07-09 PROCEDURE — 2709999900 HC NON-CHARGEABLE SUPPLY: Performed by: INTERNAL MEDICINE

## 2021-07-09 PROCEDURE — 74011250636 HC RX REV CODE- 250/636: Performed by: INTERNAL MEDICINE

## 2021-07-09 PROCEDURE — 88305 TISSUE EXAM BY PATHOLOGIST: CPT

## 2021-07-09 RX ORDER — DEXTROMETHORPHAN/PSEUDOEPHED 2.5-7.5/.8
1.2 DROPS ORAL
Status: DISCONTINUED | OUTPATIENT
Start: 2021-07-09 | End: 2021-07-09 | Stop reason: HOSPADM

## 2021-07-09 RX ORDER — FLUMAZENIL 0.1 MG/ML
0.2 INJECTION INTRAVENOUS
Status: DISCONTINUED | OUTPATIENT
Start: 2021-07-09 | End: 2021-07-09 | Stop reason: HOSPADM

## 2021-07-09 RX ORDER — EPINEPHRINE 0.1 MG/ML
1 INJECTION INTRACARDIAC; INTRAVENOUS
Status: DISCONTINUED | OUTPATIENT
Start: 2021-07-09 | End: 2021-07-09 | Stop reason: HOSPADM

## 2021-07-09 RX ORDER — PROPOFOL 10 MG/ML
INJECTION, EMULSION INTRAVENOUS AS NEEDED
Status: DISCONTINUED | OUTPATIENT
Start: 2021-07-09 | End: 2021-07-09

## 2021-07-09 RX ORDER — MIDAZOLAM HYDROCHLORIDE 1 MG/ML
.25-5 INJECTION, SOLUTION INTRAMUSCULAR; INTRAVENOUS
Status: DISCONTINUED | OUTPATIENT
Start: 2021-07-09 | End: 2021-07-09 | Stop reason: HOSPADM

## 2021-07-09 RX ORDER — NALOXONE HYDROCHLORIDE 0.4 MG/ML
0.4 INJECTION, SOLUTION INTRAMUSCULAR; INTRAVENOUS; SUBCUTANEOUS
Status: DISCONTINUED | OUTPATIENT
Start: 2021-07-09 | End: 2021-07-09 | Stop reason: HOSPADM

## 2021-07-09 RX ORDER — SODIUM CHLORIDE 9 MG/ML
INJECTION, SOLUTION INTRAVENOUS
Status: DISCONTINUED | OUTPATIENT
Start: 2021-07-09 | End: 2021-07-09 | Stop reason: HOSPADM

## 2021-07-09 RX ORDER — LIDOCAINE HYDROCHLORIDE 20 MG/ML
INJECTION, SOLUTION EPIDURAL; INFILTRATION; INTRACAUDAL; PERINEURAL AS NEEDED
Status: DISCONTINUED | OUTPATIENT
Start: 2021-07-09 | End: 2021-07-09 | Stop reason: HOSPADM

## 2021-07-09 RX ORDER — SODIUM CHLORIDE 9 MG/ML
50 INJECTION, SOLUTION INTRAVENOUS CONTINUOUS
Status: DISCONTINUED | OUTPATIENT
Start: 2021-07-09 | End: 2021-07-09 | Stop reason: HOSPADM

## 2021-07-09 RX ORDER — PROPOFOL 10 MG/ML
INJECTION, EMULSION INTRAVENOUS AS NEEDED
Status: DISCONTINUED | OUTPATIENT
Start: 2021-07-09 | End: 2021-07-09 | Stop reason: HOSPADM

## 2021-07-09 RX ORDER — ATROPINE SULFATE 0.1 MG/ML
0.4 INJECTION INTRAVENOUS
Status: DISCONTINUED | OUTPATIENT
Start: 2021-07-09 | End: 2021-07-09 | Stop reason: HOSPADM

## 2021-07-09 RX ADMIN — PROPOFOL INJECTABLE EMULSION 70 MG: 10 INJECTION, EMULSION INTRAVENOUS at 11:05

## 2021-07-09 RX ADMIN — PROPOFOL INJECTABLE EMULSION 60 MG: 10 INJECTION, EMULSION INTRAVENOUS at 10:57

## 2021-07-09 RX ADMIN — SODIUM CHLORIDE 50 ML/HR: 9 INJECTION, SOLUTION INTRAVENOUS at 09:32

## 2021-07-09 RX ADMIN — PROPOFOL INJECTABLE EMULSION 30 MG: 10 INJECTION, EMULSION INTRAVENOUS at 11:07

## 2021-07-09 RX ADMIN — SODIUM CHLORIDE: 9 INJECTION, SOLUTION INTRAVENOUS at 10:49

## 2021-07-09 RX ADMIN — PROPOFOL INJECTABLE EMULSION 40 MG: 10 INJECTION, EMULSION INTRAVENOUS at 11:01

## 2021-07-09 RX ADMIN — PROPOFOL INJECTABLE EMULSION 50 MG: 10 INJECTION, EMULSION INTRAVENOUS at 11:12

## 2021-07-09 RX ADMIN — LIDOCAINE HYDROCHLORIDE 100 MG: 20 INJECTION, SOLUTION INTRAVENOUS at 10:57

## 2021-07-09 NOTE — DISCHARGE INSTRUCTIONS
[address] [facility]. Cheo Krishnamurthy M.D.  [phone]            COLON DISCHARGE INSTRUCTIONS       2021    Danni Calvert  :  1940  [facility] Medical Record Number:  [id number]      COLONOSCOPY FINDINGS:  Your colonoscopy showed a total of 6 small polyps that were removed and sent to pathology, diverticulosis and internal hemorrhoids. DISCOMFORT:  Redness at IV site- apply warm compress to area; if redness or soreness persist- contact your physician  There may be a slight amount of blood passed from the rectum  Gaseous discomfort- walking, belching will help relieve any discomfort  You may not operate a vehicle for 12 hours  You may not engage in an occupation involving machinery or appliances for rest of today  You may not drink alcoholic beverages for at least 12 hours  Avoid making any critical decisions for at least 24 hour  DIET:   High fiber diet. - however -  remember your colon is empty and a heavy meal will produce gas. Avoid these foods:  vegetables, fried / greasy foods, carbonated drinks for today     ACTIVITY:  You may resume your normal daily activities it is recommended that you spend the remainder of the day resting -  avoid any strenuous activity. CALL M.D. ANY SIGN OF:   Increasing pain, nausea, vomiting  Abdominal distension (swelling)  New increased bleeding (oral or rectal)  Fever (chills)  Pain in chest area  Bloody discharge from nose or mouth   Shortness of breath    Follow-up Instructions:   Call Dr. Dwain Bhagat if any questions or problems. Telephone # [phone]  Biopsy results will be available in  5 to 7 days  No need for repeat surveillance colonoscopy based on age.

## 2021-07-09 NOTE — PROCEDURES
Keshawn Feliciano M.D.  [phone]            2021          Colonoscopy Operative Report  Danni Calvert  :  1940  [facility] Medical Record Number:  [id number]      Indications:    Personal history of colon polyps (screening only)     :  John Christianson MD    Referring Provider: Daniel Kim MD    Sedation:  MAC anesthesia    Pre-Procedural Exam:      Airway: clear,  No airway problems anticipated  Heart: RRR, without gallops or rubs  Lungs: clear bilaterally without wheezes, crackles, or rhonchi  Abdomen: soft, nontender, nondistended, bowel sounds present  Mental Status: awake, alert and oriented to person, place and time     Procedure Details:  After informed consent was obtained with all risks and benefits of procedure explained and preoperative exam completed, the patient was taken to the endoscopy suite and placed in the left lateral decubitus position. Upon sequential sedation as per above, a digital rectal exam was performed. The Olympus videocolonoscope  was inserted in the rectum and carefully advanced to the cecum, which was identified by the ileocecal valve and appendiceal orifice. The quality of preparation was good. The colonoscope was slowly withdrawn with careful inspection and evaluation between folds. Retroflexion in the rectum was performed. Findings:   Terminal Ileum: not intubated  Cecum: 1  Sessile polyp(s), the largest 2 mm in size;  Ascending Colon: no mucosal lesion appreciated  mild diverticulosis;  Transverse Colon: no mucosal lesion appreciated  mild diverticulosis; Descending Colon: 4  Sessile polyp(s), the largest 4 mm in size; mild diverticulosis  Sigmoid: no mucosal lesion appreciated  moderate diverticulosis; Rectum: 1  Sessile polyp(s), the largest 5 mm in size  Grade 1 internal hemorrhoid(s);     Interventions:  6 complete polypectomy were performed using cold snare  and the polyps were  retrieved    Specimen Removed:  specimen #1, 2 mm in size, located in the cecum removed by cold biopsy and sent for pathology  #2, 3 and 4 mm in size, located in the descending colon removed by cold snare and retrieved for pathology  #3, 5 mm in size, located in the rectum removed by cold snare and retrieved for pathology    Complications: None. EBL:  None. Impression:  A total of 6 polyps were removed and sent to pathology, sigmoid diverticulosis and internal hemorrhoids    Recommendations:  -High fiber diet.    -Resume normal medication(s). -No need for repeat colonoscopy for polyp surveillance based on age    Discharge Disposition:  Home in the company of a  when able to ambulate.     Angélica Douglas MD  7/9/2021  11:14 AM

## 2021-07-09 NOTE — PROGRESS NOTES

## 2021-07-09 NOTE — ANESTHESIA POSTPROCEDURE EVALUATION
Procedure(s):  COLONOSCOPY  ENDOSCOPIC POLYPECTOMY. MAC    Anesthesia Post Evaluation      Multimodal analgesia: multimodal analgesia used between 6 hours prior to anesthesia start to PACU discharge  Patient location during evaluation: PACU  Patient participation: complete - patient participated  Level of consciousness: awake and alert  Pain management: adequate  Airway patency: patent  Anesthetic complications: no  Cardiovascular status: acceptable  Respiratory status: acceptable  Hydration status: acceptable  Post anesthesia nausea and vomiting:  none  Final Post Anesthesia Temperature Assessment:  Normothermia (36.0-37.5 degrees C)      INITIAL Post-op Vital signs:   Vitals Value Taken Time   BP 86/60 07/09/21 1129   Temp 36.6 °C (97.8 °F) 07/09/21 1119   Pulse 74 07/09/21 1134   Resp 13 07/09/21 1134   SpO2 97 % 07/09/21 1134   Vitals shown include unvalidated device data.

## 2021-07-09 NOTE — PROGRESS NOTES
Endoscopy discharge instructions have been reviewed and given to patient. The patient verbalized understanding and acceptance of instructions. Dr. Glenn Ordoñez discussed with patient procedure findings and next steps.

## 2021-07-09 NOTE — ANESTHESIA PREPROCEDURE EVALUATION
Anesthetic History   No history of anesthetic complications            Review of Systems / Medical History  Patient summary reviewed, nursing notes reviewed and pertinent labs reviewed    Pulmonary  Within defined limits            Pertinent negatives: No recent URI     Neuro/Psych   Within defined limits           Cardiovascular    Hypertension              Exercise tolerance: >4 METS     GI/Hepatic/Renal  Within defined limits              Endo/Other  Within defined limits           Other Findings              Physical Exam    Airway  Mallampati: II  TM Distance: 4 - 6 cm  Neck ROM: normal range of motion   Mouth opening: Normal     Cardiovascular    Rhythm: regular  Rate: normal         Dental  No notable dental hx       Pulmonary  Breath sounds clear to auscultation               Abdominal         Other Findings            Anesthetic Plan    ASA: 2  Anesthesia type: MAC            Anesthetic plan and risks discussed with: Patient

## 2021-07-09 NOTE — H&P
Raj Bacon M.D.  [phone]            History and Physical       NAME:  Beltran Armas   :   1940   MRN:   [id number]       Referring Physician:  Dr. Nash Williamson Date: 2021 10:55 AM    Chief Complaint:  Colon cancer screening    History of Present Illness:  Patient is a 80 y.o. who is seen for colon cancer screening and colon polyp surveillance. Denies any ongoing GI complaints. PMH:  Past Medical History:   Diagnosis Date    Blood in urine     Cancer ([address]     skin : Squamous cell ca. x 2    Hypertension     Ill-defined condition     frontal lobe dementia    Microhematuria     negative work up . Dr. Sagar Gray Overdose of benzodiazepine 2016    14 Lorazepam , hosp at [facility] Pancreatic cyst     Psychiatric disorder     anxiety, depression    Vitamin D deficiency 2019       PSH:  Past Surgical History:   Procedure Laterality Date    COLONOSCOPY N/A 2017    COLONOSCOPY performed by Robina Whelan MD at [address] HX CHOLECYSTECTOMY      HX COLONOSCOPY  14    polypectomies, f/u 3 Y, Dr. Zuluaga [place]  2017    SCC right nasal bridge by Dr. Kelby Roach    has rods in back    HX TONSILLECTOMY      HX UROLOGICAL      CYSTOSCOPY       Allergies: Allergies   Allergen Reactions    Lexapro [Escitalopram] Other (comments)     Strange thoughts    Other Medication Other (comments)     Distant past : unable to take some un named anti depressants due to mild side effects. Home Medications:  Prior to Admission Medications   Prescriptions Last Dose Informant Patient Reported? Taking? albuterol (PROVENTIL HFA, VENTOLIN HFA, PROAIR HFA) 90 mcg/actuation inhaler Not Taking at Unknown time  No No   Sig: Take 2 Puffs by inhalation every four (4) hours as needed for Wheezing.    Patient not taking: Reported on 2021   ergocalciferol (ERGOCALCIFEROL) 1,250 mcg (50,000 unit) capsule   No No   Sig: Take 1 Capsule by mouth every seven (7) days.    losartan (COZAAR) 50 mg tablet 7/8/2021 at Unknown time  No Yes   Sig: TAKE 1 TABLET BY MOUTH EVERY DAY      Facility-Administered Medications: None       Hospital Medications:  Current Facility-Administered Medications   Medication Dose Route Frequency    0.9% sodium chloride infusion  50 mL/hr IntraVENous CONTINUOUS    midazolam (VERSED) injection 0.25-5 mg  0.25-5 mg IntraVENous Multiple    naloxone (NARCAN) injection 0.4 mg  0.4 mg IntraVENous Multiple    flumazeniL (ROMAZICON) 0.1 mg/mL injection 0.2 mg  0.2 mg IntraVENous Multiple    simethicone (MYLICON) 66DT/7.4ZO oral drops 80 mg  1.2 mL Oral Multiple    atropine injection 0.4 mg  0.4 mg IntraVENous ONCE PRN    EPINEPHrine (ADRENALIN) 0.1 mg/mL syringe 1 mg  1 mg Endoscopically ONCE PRN     Facility-Administered Medications Ordered in Other Encounters   Medication Dose Route Frequency    0.9% sodium chloride infusion   IntraVENous CONTINUOUS       Social History:  Social History     Tobacco Use    Smoking status: Current Every Day Smoker     Packs/day: 2.00    Smokeless tobacco: Never Used   Substance Use Topics    Alcohol use: No       Family History:  Family History   Problem Relation Age of Onset    Lung Disease Mother         emphysema    Heart Disease Mother     Cancer Father         lung             Review of Systems:      Constitutional: negative fever, negative chills, negative weight loss  Eyes:   negative visual changes  ENT:   negative sore throat, tongue or lip swelling  Respiratory:  negative cough, negative dyspnea  Cards:  negative for chest pain, palpitations, lower extremity edema  GI:   See HPI  :  negative for frequency, dysuria  Integument:  negative for rash and pruritus  Heme:  negative for easy bruising and gum/nose bleeding  Musculoskel: negative for myalgias,  back pain and muscle weakness  Neuro: negative for headaches, dizziness, vertigo  Psych:  negative for feelings of anxiety, depression       Objective:     Patient Vitals for the past 8 hrs:   BP Temp Pulse Resp SpO2 Height Weight   07/09/21 0918 123/74 99.3 °F (37.4 °C) 91 17 96 % 5' 3\" (1.6 m) 96.4 kg (212 lb 8.4 oz)     No intake/output data recorded. No intake/output data recorded. EXAM:     NEURO-a&o   HEENT-wnl   LUNGS-clear    COR-regular rate and rhythym     ABD-soft , no tenderness, no rebound, good bs     EXT-no edema     Data Review     No results for input(s): WBC, HGB, HCT, PLT, HGBEXT, HCTEXT, PLTEXT in the last 72 hours. No results for input(s): NA, K, CL, CO2, BUN, CREA, GLU, PHOS, CA in the last 72 hours. No results for input(s): AP, TBIL, TP, ALB, GLOB, GGT, AML, LPSE in the last 72 hours. No lab exists for component: SGOT, GPT, AMYP, HLPSE  No results for input(s): INR, PTP, APTT, INREXT in the last 72 hours. Patient Active Problem List   Diagnosis Code    Tobacco abuse Z72.0    Essential hypertension I10    Single current episode of major depressive disorder F32.9    Overdose of benzodiazepine T42.4X1A    Vitamin D deficiency E55.9    Severe obesity [address] [address] E66.01      Assessment:   · Colon cancer screening   Plan:   · Colonoscopy today.      Signed By: Verónica Patrick MD     7/9/2021  10:55 AM

## 2021-07-09 NOTE — PROGRESS NOTES
Pelon Houston  1940  [id number]    Situation:  Verbal report received from:   ABEL Sullivan RN   Procedure: Procedure(s):  COLONOSCOPY  ENDOSCOPIC POLYPECTOMY    Background:    Preoperative diagnosis: PERSONAL HX OF POLYPS  Postoperative diagnosis: 1.- diverticulosis  2.- cecum polyps    :  Dr. Emelyn Ortiz   Assistant(s): Endoscopy Technician-1: Elly Oconnor  Endoscopy RN-1: Alyce Pierre    Specimens:   ID Type Source Tests Collected by Time Destination   1 : cecum polyp Preservative   Anuradha Lozano MD 7/9/2021 1103 Pathology   2 : descending colon polyps Preservative   Anuradha Lozano MD 7/9/2021 1106 Pathology   3 : rectal polyp Preservative   Anuradha Lozano MD 7/9/2021 1112 Pathology     H. Pylori  no    Assessment:  Intra-procedure medications     Anesthesia gave intra-procedure sedation and medications, see anesthesia flow sheet yes    Intravenous fluids: NS@ KVO     Vital signs stable   yes    Abdominal assessment: round and soft   yes    Recommendation:  Discharge patient per MD order  yes.   Return to floor  outpatient  Family or Friend   Son   Permission to share finding with family or friend yes

## 2022-03-18 PROBLEM — E66.01 SEVERE OBESITY (HCC): Status: ACTIVE | Noted: 2019-02-25

## 2022-03-19 PROBLEM — E55.9 VITAMIN D DEFICIENCY: Status: ACTIVE | Noted: 2019-02-25

## 2023-05-25 RX ORDER — LOSARTAN POTASSIUM 50 MG/1
1 TABLET ORAL DAILY
COMMUNITY
Start: 2022-09-20

## 2023-05-25 RX ORDER — MEMANTINE HYDROCHLORIDE 5 MG/1
TABLET ORAL
COMMUNITY
Start: 2022-04-27

## 2023-05-25 RX ORDER — METHYLPREDNISOLONE 4 MG/1
TABLET ORAL
COMMUNITY
Start: 2023-01-16

## 2023-05-25 RX ORDER — ALBUTEROL SULFATE 90 UG/1
2 AEROSOL, METERED RESPIRATORY (INHALATION) EVERY 4 HOURS PRN
COMMUNITY
Start: 2019-07-15

## 2023-08-17 NOTE — PROCEDURES
Cancer Center Progress Note    Patient Name: Radha Alexander   YOB: 1948   Medical Record Number: [id number]     Chief Complaint:  Colon cancer  Chemotherapy    Oncology History:   3/6/2020: CLN with sigmoid mass s/p sigmoid resection 3/2020: stage IIA (kosik) INNA, KITA wt  11/2020: Recurrent in peritoneal, liver s/p FOLFIRI/avastin 11/2020-5/19/21, followed by HIPEC mitomycin 6/29/21 and 6 months of cetuximab 7/28/21-12/29/2021. 4/2022: imaging and CEA consistent with recurrence, started on FU/Demetria and oxali added 5/19/22      Treatment  4/12/22 C1 5FU/ Demetria . G360: BRAF amplification 2.3%  5/19/22 FOLFOX dose reduced with neulasta support d/t delays with neutropenia  9/7/2022 FOLFOX/demetria.  txt break due to PUD perforation. Demetria held, restarted 11/2022 1/4/23: Started Iri/Cetux  5/2023: pleural fluid positive for malignancy; imaging consistent w/ progression of disease  S/p 1C 5FU/demetria while awaiting for lonsurf (oxali held 2/2 shingles)   6/2023: lonsurf/demetria     Interval History:  8/17/23  Here for follow up. Came back from [place] last night. She had a great time. Feeling well. No abd pain. No n/v. No fever/chills. Breathing stable. PMH Colon cacer s/p resection and HIPEC, THR   Family History: neg for cancer  Social History: [place] descent. She lives close to her daughter (NP-Ob), other children are farther away. Current Outpatient Medications:     furosemide 20 MG Oral Tab, Take 1 tablet (20 mg total) by mouth daily. , Disp: 30 tablet, Rfl: 0    GABAPENTIN 300 MG Oral Cap, TAKE 1 CAPSULE(300 MG) BY MOUTH EVERY NIGHT, Disp: 30 capsule, Rfl: 0    acyclovir 400 MG Oral Tab, Take 1 tablet (400 mg total) by mouth 2 (two) times daily. , Disp: 60 tablet, Rfl: 3    trifluridine-tipiracil 15-6.14 MG Oral tab, Take 4 tablets (60 mg total) by mouth 2 (two) times daily.  Take twice a day on days 1-5, and then days 8-12 in a 28-day cycle., Disp: 20 tablet, Rfl: 11    DULoxetine 20 MG Oral Cap DR Particles, Cici Guerra M.D.  [phone]            2017          Colonoscopy Operative Report  Augusta Stewart  :  1940  Reza Medical Record Number:  [id number]      Indications:    Personal history of colon polyps (screening only)     :  Violeta Santoyo MD    Referring Provider: Jeff Ortiz MD    Sedation:  MAC anesthesia    Pre-Procedural Exam:      Airway: clear,  No airway problems anticipated  Heart: RRR, without gallops or rubs  Lungs: clear bilaterally without wheezes, crackles, or rhonchi  Abdomen: soft, nontender, nondistended, bowel sounds present  Mental Status: awake, alert and oriented to person, place and time     Procedure Details:  After informed consent was obtained with all risks and benefits of procedure explained and preoperative exam completed, the patient was taken to the endoscopy suite and placed in the left lateral decubitus position. Upon sequential sedation as per above, a digital rectal exam was performed. The Olympus videocolonoscope  was inserted in the rectum and carefully advanced to the cecum, which was identified by the ileocecal valve and appendiceal orifice, terminal ileum. The quality of preparation was good. The colonoscope was slowly withdrawn with careful inspection and evaluation between folds. Retroflexion in the rectum was performed. Findings:   Terminal Ileum: normal  Cecum: 1  Sessile polyp(s), the largest 2 mm in size;  Ascending Colon: no mucosal lesion appreciated  mild diverticulosis;  Transverse Colon: 1  Sessile polyp(s), the largest 3 mm in size  mild diverticulosis; Descending Colon: no mucosal lesion appreciated  mild diverticulosis; Sigmoid: 1  Pedunculated polyp(s), the largest 10 mm in size  moderate diverticulosis; Rectum: 1  Sessile polyp(s), the largest 3 mm in size  Grade 1 internal hemorrhoid(s);     Interventions:  4 complete polypectomy were performed using hot snare  and cold snare and Take 1 capsule (20 mg total) by mouth daily. , Disp: 90 capsule, Rfl: 3    Clindamycin Phosphate (CLEOCIN-T) 1 % External Gel, Apply to affected area bid, Disp: 60 g, Rfl: 1    pantoprazole 40 MG Oral Tab EC, Take 1 tablet (40 mg total) by mouth every morning before breakfast., Disp: 90 tablet, Rfl: 3    Lactobacillus Rhamnosus, GG, (CULTURELLE OR), Take by mouth daily. , Disp: , Rfl:     Multiple Vitamins-Minerals (MULTIVITAMIN WOMEN OR), Take by mouth., Disp: , Rfl:     Probiotic Product (PROBIOTIC DAILY OR), Take by mouth., Disp: , Rfl:     docusate sodium 100 MG Oral Cap, Take 100 mg by mouth daily. , Disp: , Rfl:     Lidocaine Viscous HCl 2 % Mouth/Throat Solution, Take 5 mL by mouth 4 (four) times daily as needed for Pain (oral/throat discomfort from chemotherapy). Combine with 5ml Maalox and 5ml liquid Benadryl. Swish and spit/swallow. , Disp: 100 mL, Rfl: 1    loratadine 10 MG Oral Tab, Take 1 tablet (10 mg total) by mouth daily. , Disp: 30 tablet, Rfl: 0    ondansetron (ZOFRAN) 8 MG tablet, Take 1 tablet (8 mg total) by mouth every 8 (eight) hours as needed for Nausea., Disp: 30 tablet, Rfl: 3    prochlorperazine (COMPAZINE) 10 mg tablet, Take 1 tablet (10 mg total) by mouth every 8 (eight) hours as needed for Nausea., Disp: 60 tablet, Rfl: 3    lidocaine-prilocaine 2.5-2.5 % External Cream, Apply to site 1 hour prior to port a cath needle insertion, Disp: 30 g, Rfl: 2    Vitamin D3 50 MCG (2000 UT) Oral Cap, Take 1 capsule (2,000 Units total) by mouth in the morning and 1 capsule (2,000 Units total) before bedtime. Per pt. Takes 4000 units daily(at one time). , Disp: , Rfl:     nystatin 135868 UNIT/GM External Cream, aply peasize to affected area twice (Patient taking differently: as needed.  aply peasize to affected area twice), Disp: 15 g, Rfl: 0    Acetaminophen 500 MG Oral Cap, Take by mouth every 6 (six) hours as needed for Pain., Disp: , Rfl:     No Known Allergies     Review of Systems: Onc ROS neg except biopsy forceps and the polyps were  retrieved    Specimen Removed:  specimen #1, 3 mm in size, located in the rectum removed by cold snare and retrieved for pathology  #2, 2 mm in size, located in the cecum removed by cold biopsy and sent for pathology  #3, Random colon biopsies  #4, 3 mm in size, located in the transverse colon removed by cold snare and retrieved for pathology  #5, 10 mm in size, located in the sigmoid removed by snare cautery and retrieved for pathology      Complications: None. EBL:  None. Impression:  A total of 4 polyps were removed and sent to pathology                         Pandiverticulosis and small internal hemorrhoids    Recommendations:  -Repeat colonoscopy in 3 years.   -High fiber diet.    -Resume normal medication(s). Discharge Disposition:  Home in the company of a  when able to ambulate.     Kaleb Horne MD  12/8/2017  10:00 AM HPI    There were no vitals taken for this visit. Wt Readings from Last 6 Encounters:  08/08/23 : 54.3 kg (119 lb 9.6 oz)  08/02/23 : 55.1 kg (121 lb 6.4 oz)  07/19/23 : 55.2 kg (121 lb 9.6 oz)  07/05/23 : 54.6 kg (120 lb 6.4 oz)  07/03/23 : 54.9 kg (121 lb)  06/21/23 : 55.3 kg (122 lb)  General: Patient is alert and oriented x 3,  HEENT: No icterus  Chest: basilar bs bl   Abd: Soft, non tender  Extremity: trace non pitting edema     Labs reviewed. 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 Now on lonsurf+Bevacizumab.     -Here for clearance of cycle 3 Lonsurf plus bevacizumab. Had delay of tx due to neutropenia. The patient was previously receiving Neulasta injections with prior therapies, so we could consider this on day 15 of therapy- added to tx plan. We will also plan for dose reduction of Lonsurf to 3 tablets instead of 4 tablets. -CEA increasing. She is due for restaging prior to cycle 4 which will tentatively be early September and we can schedule this at her next follow-up. Pancytopenia 2/2 Lonsurf. Monitor for now. Dose reduction per above. Add neulasta. Infectious precautions.      L femur lytic lesion-completed palliative RT 6/27 - 7/3    Shingles - ppx acyclovir 400mg bid     SOB- likely due to effusion- thora as needed (2 x thus far). Monitor for now. Consider pluerX with recurrent episodes. 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 H/o PUD perforation- PPI, no further issues. ok to hold for now given above  Macrocytosis- recent b12 nl. MDM: high, malignancy, life threatening.  Drug therapy requiring intensive monitoring for tox      Ita Velasco PA-C

## 2024-07-19 ENCOUNTER — HOSPITAL ENCOUNTER (OUTPATIENT)
Age: 84
End: 2024-07-19
Payer: MEDICARE

## 2024-07-19 DIAGNOSIS — M54.32 SCIATICA OF LEFT SIDE: ICD-10-CM

## 2024-07-19 DIAGNOSIS — R73.09 ELEVATED GLUCOSE: ICD-10-CM

## 2024-07-19 DIAGNOSIS — I10 ESSENTIAL HYPERTENSION: ICD-10-CM

## 2024-07-19 DIAGNOSIS — R06.00 DYSPNEA, UNSPECIFIED TYPE: ICD-10-CM

## 2024-07-19 PROCEDURE — 71046 X-RAY EXAM CHEST 2 VIEWS: CPT

## 2024-07-20 ENCOUNTER — APPOINTMENT (OUTPATIENT)
Facility: HOSPITAL | Age: 84
End: 2024-07-20
Payer: MEDICARE

## 2024-07-20 ENCOUNTER — HOSPITAL ENCOUNTER (EMERGENCY)
Facility: HOSPITAL | Age: 84
Discharge: HOME | End: 2024-07-20
Attending: STUDENT IN AN ORGANIZED HEALTH CARE EDUCATION/TRAINING PROGRAM
Payer: MEDICARE

## 2024-07-20 VITALS
HEIGHT: 63 IN | HEART RATE: 86 BPM | SYSTOLIC BLOOD PRESSURE: 167 MMHG | WEIGHT: 218.26 LBS | DIASTOLIC BLOOD PRESSURE: 98 MMHG | OXYGEN SATURATION: 96 % | BODY MASS INDEX: 38.67 KG/M2 | TEMPERATURE: 97.8 F | RESPIRATION RATE: 23 BRPM

## 2024-07-20 DIAGNOSIS — N30.01 ACUTE CYSTITIS WITH HEMATURIA: Primary | ICD-10-CM

## 2024-07-20 LAB
ALBUMIN SERPL-MCNC: 3.7 G/DL (ref 3.5–5)
ALBUMIN/GLOB SERPL: 1 (ref 1.1–2.2)
ALP SERPL-CCNC: 90 U/L (ref 45–117)
ALT SERPL-CCNC: 27 U/L (ref 12–78)
ANION GAP SERPL CALC-SCNC: 8 MMOL/L (ref 5–15)
APPEARANCE UR: ABNORMAL
AST SERPL-CCNC: 20 U/L (ref 15–37)
BACTERIA URNS QL MICRO: ABNORMAL /HPF
BASOPHILS # BLD: 0.1 K/UL (ref 0–0.1)
BASOPHILS NFR BLD: 1 % (ref 0–1)
BILIRUB SERPL-MCNC: 0.5 MG/DL (ref 0.2–1)
BILIRUB UR QL: NEGATIVE
BUN SERPL-MCNC: 14 MG/DL (ref 6–20)
BUN/CREAT SERPL: 18 (ref 12–20)
CALCIUM SERPL-MCNC: 9.2 MG/DL (ref 8.5–10.1)
CHLORIDE SERPL-SCNC: 104 MMOL/L (ref 97–108)
CO2 SERPL-SCNC: 28 MMOL/L (ref 21–32)
COLOR UR: ABNORMAL
COMMENT:: NORMAL
CREAT SERPL-MCNC: 0.8 MG/DL (ref 0.55–1.02)
DIFFERENTIAL METHOD BLD: NORMAL
EKG ATRIAL RATE: 79 BPM
EKG DIAGNOSIS: NORMAL
EKG P AXIS: 62 DEGREES
EKG P-R INTERVAL: 202 MS
EKG Q-T INTERVAL: 390 MS
EKG QRS DURATION: 86 MS
EKG QTC CALCULATION (BAZETT): 447 MS
EKG R AXIS: -34 DEGREES
EKG T AXIS: 37 DEGREES
EKG VENTRICULAR RATE: 79 BPM
EOSINOPHIL # BLD: 0.2 K/UL (ref 0–0.4)
EOSINOPHIL NFR BLD: 3 % (ref 0–7)
EPITH CASTS URNS QL MICRO: ABNORMAL /LPF
ERYTHROCYTE [DISTWIDTH] IN BLOOD BY AUTOMATED COUNT: 12.6 % (ref 11.5–14.5)
GLOBULIN SER CALC-MCNC: 3.7 G/DL (ref 2–4)
GLUCOSE BLD STRIP.AUTO-MCNC: 115 MG/DL (ref 65–117)
GLUCOSE SERPL-MCNC: 110 MG/DL (ref 65–100)
GLUCOSE UR STRIP.AUTO-MCNC: NEGATIVE MG/DL
HCT VFR BLD AUTO: 44.5 % (ref 35–47)
HGB BLD-MCNC: 14.9 G/DL (ref 11.5–16)
HGB UR QL STRIP: ABNORMAL
HYALINE CASTS URNS QL MICRO: ABNORMAL /LPF (ref 0–5)
IMM GRANULOCYTES # BLD AUTO: 0 K/UL (ref 0–0.04)
IMM GRANULOCYTES NFR BLD AUTO: 0 % (ref 0–0.5)
KETONES UR QL STRIP.AUTO: NEGATIVE MG/DL
LEUKOCYTE ESTERASE UR QL STRIP.AUTO: ABNORMAL
LYMPHOCYTES # BLD: 2.3 K/UL (ref 0.8–3.5)
LYMPHOCYTES NFR BLD: 33 % (ref 12–49)
MCH RBC QN AUTO: 29.9 PG (ref 26–34)
MCHC RBC AUTO-ENTMCNC: 33.5 G/DL (ref 30–36.5)
MCV RBC AUTO: 89.2 FL (ref 80–99)
MONOCYTES # BLD: 0.6 K/UL (ref 0–1)
MONOCYTES NFR BLD: 8 % (ref 5–13)
NEUTS SEG # BLD: 4 K/UL (ref 1.8–8)
NEUTS SEG NFR BLD: 55 % (ref 32–75)
NITRITE UR QL STRIP.AUTO: POSITIVE
NRBC # BLD: 0 K/UL (ref 0–0.01)
NRBC BLD-RTO: 0 PER 100 WBC
PH UR STRIP: 6.5 (ref 5–8)
PLATELET # BLD AUTO: 251 K/UL (ref 150–400)
PMV BLD AUTO: 10.9 FL (ref 8.9–12.9)
POTASSIUM SERPL-SCNC: 3.9 MMOL/L (ref 3.5–5.1)
PROT SERPL-MCNC: 7.4 G/DL (ref 6.4–8.2)
PROT UR STRIP-MCNC: ABNORMAL MG/DL
RBC # BLD AUTO: 4.99 M/UL (ref 3.8–5.2)
RBC #/AREA URNS HPF: ABNORMAL /HPF (ref 0–5)
SERVICE CMNT-IMP: NORMAL
SODIUM SERPL-SCNC: 140 MMOL/L (ref 136–145)
SP GR UR REFRACTOMETRY: 1.01 (ref 1–1.03)
SPECIMEN HOLD: NORMAL
TROPONIN I SERPL HS-MCNC: 9 NG/L (ref 0–51)
URINE CULTURE IF INDICATED: ABNORMAL
UROBILINOGEN UR QL STRIP.AUTO: 0.2 EU/DL (ref 0.2–1)
WBC # BLD AUTO: 7.1 K/UL (ref 3.6–11)
WBC URNS QL MICRO: ABNORMAL /HPF (ref 0–4)

## 2024-07-20 PROCEDURE — 87186 SC STD MICRODIL/AGAR DIL: CPT

## 2024-07-20 PROCEDURE — 99284 EMERGENCY DEPT VISIT MOD MDM: CPT

## 2024-07-20 PROCEDURE — 87088 URINE BACTERIA CULTURE: CPT

## 2024-07-20 PROCEDURE — 87086 URINE CULTURE/COLONY COUNT: CPT

## 2024-07-20 PROCEDURE — 82962 GLUCOSE BLOOD TEST: CPT

## 2024-07-20 PROCEDURE — 70450 CT HEAD/BRAIN W/O DYE: CPT

## 2024-07-20 PROCEDURE — 84484 ASSAY OF TROPONIN QUANT: CPT

## 2024-07-20 PROCEDURE — 96374 THER/PROPH/DIAG INJ IV PUSH: CPT

## 2024-07-20 PROCEDURE — 6360000002 HC RX W HCPCS: Performed by: STUDENT IN AN ORGANIZED HEALTH CARE EDUCATION/TRAINING PROGRAM

## 2024-07-20 PROCEDURE — 36415 COLL VENOUS BLD VENIPUNCTURE: CPT

## 2024-07-20 PROCEDURE — 80053 COMPREHEN METABOLIC PANEL: CPT

## 2024-07-20 PROCEDURE — 85025 COMPLETE CBC W/AUTO DIFF WBC: CPT

## 2024-07-20 PROCEDURE — 81001 URINALYSIS AUTO W/SCOPE: CPT

## 2024-07-20 PROCEDURE — 93005 ELECTROCARDIOGRAM TRACING: CPT

## 2024-07-20 PROCEDURE — 2580000003 HC RX 258: Performed by: STUDENT IN AN ORGANIZED HEALTH CARE EDUCATION/TRAINING PROGRAM

## 2024-07-20 RX ORDER — NITROFURANTOIN 25; 75 MG/1; MG/1
100 CAPSULE ORAL 2 TIMES DAILY
Qty: 20 CAPSULE | Refills: 0 | Status: SHIPPED | OUTPATIENT
Start: 2024-07-20 | End: 2024-07-22

## 2024-07-20 RX ADMIN — WATER 1000 MG: 1 INJECTION INTRAMUSCULAR; INTRAVENOUS; SUBCUTANEOUS at 13:39

## 2024-07-20 ASSESSMENT — PAIN SCALES - GENERAL: PAINLEVEL_OUTOF10: 0

## 2024-07-20 NOTE — ED PROVIDER NOTES
H/o frontotemporal dementia, with abrupt onset of AMS to include disorientation regarding time/events, created medical issues to include believing a tape worm/leeches were seen during a colonoscopy 2 years ago, and vacant stares, over past week.  CXR last night unremarkable.     10:45 AM  I have evaluated the patient as the Provider in Rapid Medical Evaluation (RME). I have reviewed her vital signs and the triage nurse assessment. I have talked with the patient and any available family and advised that I am the provider in triage and have ordered the appropriate study to initiate their work up based on the clinical presentation during my assessment. I have advised that the patient will be accommodated in the Main ED as soon as possible. I have also requested to contact the triage nurse or myself immediately if the patient experiences any changes in their condition during this brief waiting period.  JEANETTE Howell, Ramesh TUTTLE PA-C  07/20/24 1046
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
## 2024-07-20 NOTE — ED NOTES
Pt discharged per provider order. Discharge instructions reviewed with patient, opportunity for questions provided. Pt verbalized understanding. Pt wheeled out of ED by staff with family.

## 2024-07-20 NOTE — ED PROVIDER NOTES
Other    FAMILY HISTORY       Family History   Problem Relation Age of Onset    Heart Disease Mother     Cancer Father         lung    Lung Disease Mother         emphysema          SOCIAL HISTORY       Social History     Socioeconomic History    Marital status:    Tobacco Use    Smoking status: Every Day     Current packs/day: 2.00     Types: Cigarettes    Smokeless tobacco: Never   Substance and Sexual Activity    Alcohol use: No    Drug use: No           PHYSICAL EXAM    (up to 7 for level 4, 8 or more for level 5)     ED Triage Vitals [07/20/24 1040]   BP Temp Temp Source Pulse Respirations SpO2 Height Weight - Scale   (!) 157/88 97.8 °F (36.6 °C) Tympanic 86 20 98 % 1.6 m (5' 3\") 99 kg (218 lb 4.1 oz)       Body mass index is 38.66 kg/m².    Physical Exam  Vitals and nursing note reviewed.   Constitutional:       Appearance: Normal appearance. She is obese.   HENT:      Head: Normocephalic and atraumatic.      Nose: Nose normal.   Eyes:      Extraocular Movements: Extraocular movements intact.      Pupils: Pupils are equal, round, and reactive to light.   Cardiovascular:      Rate and Rhythm: Normal rate and regular rhythm.      Pulses: Normal pulses.      Heart sounds: Normal heart sounds.   Pulmonary:      Effort: Pulmonary effort is normal.      Breath sounds: Normal breath sounds.   Abdominal:      General: Bowel sounds are normal.      Palpations: Abdomen is soft.   Musculoskeletal:         General: Normal range of motion.      Cervical back: Normal range of motion and neck supple.      Right lower leg: Tenderness present. Edema present.      Left lower leg: Tenderness present. Edema present.   Skin:     General: Skin is warm and dry.   Neurological:      General: No focal deficit present.      Mental Status: She is alert and oriented to person, place, and time. Mental status is at baseline.      GCS: GCS eye subscore is 4. GCS verbal subscore is 5. GCS motor subscore is 6.   Psychiatric:          Mood and Affect: Mood normal.         Behavior: Behavior normal.         Thought Content: Thought content is paranoid and delusional.         DIAGNOSTIC RESULTS     EKG: All EKG's are interpreted by the Emergency Department Physician who either signs or Co-signs this chart in the absence of a cardiologist.        RADIOLOGY:   Non-plain film images such as CT, Ultrasound and MRI are read by the radiologist. Plain radiographic images are visualized and preliminarily interpreted by the emergency physician with the below findings:        Interpretation per the Radiologist below, if available at the time of this note:    CT HEAD WO CONTRAST   Final Result   No acute intracranial abnormality.      Electronically signed by Chase Jacques           LABS:  Labs Reviewed   COMPREHENSIVE METABOLIC PANEL - Abnormal; Notable for the following components:       Result Value    Glucose 110 (*)     Albumin/Globulin Ratio 1.0 (*)     All other components within normal limits   URINALYSIS WITH REFLEX TO CULTURE - Abnormal; Notable for the following components:    Appearance CLOUDY (*)     Protein, UA TRACE (*)     Blood, Urine SMALL (*)     Nitrite, Urine Positive (*)     Leukocyte Esterase, Urine SMALL (*)     BACTERIA, URINE 4+ (*)     Urine Culture if Indicated URINE CULTURE ORDERED (*)     All other components within normal limits   CULTURE, URINE   CBC WITH AUTO DIFFERENTIAL   TROPONIN   EXTRA TUBES HOLD   POCT GLUCOSE       All other labs were within normal range or not returned as of this dictation.    EMERGENCY DEPARTMENT COURSE and DIFFERENTIAL DIAGNOSIS/MDM:   Vitals:    Vitals:    07/20/24 1040 07/20/24 1115 07/20/24 1200   BP: (!) 157/88 (!) 170/80 (!) 148/77   Pulse: 86 83 76   Resp: 20 20 19   Temp: 97.8 °F (36.6 °C)     TempSrc: Tympanic     SpO2: 98% 97% 94%   Weight: 99 kg (218 lb 4.1 oz)     Height: 1.6 m (5' 3\")             Medical Decision Making  Vertebral dementia progression, UTI, CVA.  84-year-old female

## 2024-07-21 LAB
BACTERIA SPEC CULT: ABNORMAL
CC UR VC: ABNORMAL
SERVICE CMNT-IMP: ABNORMAL

## 2024-07-22 LAB
BACTERIA SPEC CULT: ABNORMAL
CC UR VC: ABNORMAL
SERVICE CMNT-IMP: ABNORMAL

## 2024-07-26 ENCOUNTER — HOSPITAL ENCOUNTER (EMERGENCY)
Facility: HOSPITAL | Age: 84
Discharge: HOME | End: 2024-07-26
Attending: EMERGENCY MEDICINE
Payer: MEDICARE

## 2024-07-26 VITALS
SYSTOLIC BLOOD PRESSURE: 142 MMHG | BODY MASS INDEX: 38.75 KG/M2 | HEIGHT: 63 IN | OXYGEN SATURATION: 97 % | RESPIRATION RATE: 16 BRPM | WEIGHT: 218.7 LBS | HEART RATE: 80 BPM | DIASTOLIC BLOOD PRESSURE: 91 MMHG | TEMPERATURE: 98.6 F

## 2024-07-26 DIAGNOSIS — F02.80 FRONTOTEMPORAL DEMENTIA (HCC): ICD-10-CM

## 2024-07-26 DIAGNOSIS — G31.09 FRONTOTEMPORAL DEMENTIA (HCC): ICD-10-CM

## 2024-07-26 DIAGNOSIS — F22 DELUSIONS (HCC): Primary | ICD-10-CM

## 2024-07-26 LAB
ALBUMIN SERPL-MCNC: 3.5 G/DL (ref 3.5–5)
ALBUMIN/GLOB SERPL: 1 (ref 1.1–2.2)
ALP SERPL-CCNC: 84 U/L (ref 45–117)
ALT SERPL-CCNC: 31 U/L (ref 12–78)
ANION GAP SERPL CALC-SCNC: 7 MMOL/L (ref 5–15)
APPEARANCE UR: CLEAR
AST SERPL-CCNC: 17 U/L (ref 15–37)
BACTERIA URNS QL MICRO: NEGATIVE /HPF
BASOPHILS # BLD: 0.1 K/UL (ref 0–0.1)
BASOPHILS NFR BLD: 1 % (ref 0–1)
BILIRUB SERPL-MCNC: 0.7 MG/DL (ref 0.2–1)
BILIRUB UR QL: NEGATIVE
BUN SERPL-MCNC: 17 MG/DL (ref 6–20)
BUN/CREAT SERPL: 22 (ref 12–20)
CALCIUM SERPL-MCNC: 9 MG/DL (ref 8.5–10.1)
CHLORIDE SERPL-SCNC: 107 MMOL/L (ref 97–108)
CO2 SERPL-SCNC: 27 MMOL/L (ref 21–32)
COLOR UR: ABNORMAL
COMMENT:: NORMAL
CREAT SERPL-MCNC: 0.79 MG/DL (ref 0.55–1.02)
DIFFERENTIAL METHOD BLD: NORMAL
EOSINOPHIL # BLD: 0.2 K/UL (ref 0–0.4)
EOSINOPHIL NFR BLD: 2 % (ref 0–7)
EPITH CASTS URNS QL MICRO: ABNORMAL /LPF
ERYTHROCYTE [DISTWIDTH] IN BLOOD BY AUTOMATED COUNT: 12.9 % (ref 11.5–14.5)
GLOBULIN SER CALC-MCNC: 3.5 G/DL (ref 2–4)
GLUCOSE SERPL-MCNC: 128 MG/DL (ref 65–100)
GLUCOSE UR STRIP.AUTO-MCNC: NEGATIVE MG/DL
HCT VFR BLD AUTO: 40.3 % (ref 35–47)
HGB BLD-MCNC: 14 G/DL (ref 11.5–16)
HGB UR QL STRIP: ABNORMAL
IMM GRANULOCYTES # BLD AUTO: 0 K/UL (ref 0–0.04)
IMM GRANULOCYTES NFR BLD AUTO: 0 % (ref 0–0.5)
KETONES UR QL STRIP.AUTO: NEGATIVE MG/DL
LEUKOCYTE ESTERASE UR QL STRIP.AUTO: ABNORMAL
LYMPHOCYTES # BLD: 2.7 K/UL (ref 0.8–3.5)
LYMPHOCYTES NFR BLD: 26 % (ref 12–49)
MCH RBC QN AUTO: 30.4 PG (ref 26–34)
MCHC RBC AUTO-ENTMCNC: 34.7 G/DL (ref 30–36.5)
MCV RBC AUTO: 87.4 FL (ref 80–99)
MONOCYTES # BLD: 0.8 K/UL (ref 0–1)
MONOCYTES NFR BLD: 8 % (ref 5–13)
NEUTS SEG # BLD: 6.5 K/UL (ref 1.8–8)
NEUTS SEG NFR BLD: 63 % (ref 32–75)
NITRITE UR QL STRIP.AUTO: NEGATIVE
NRBC # BLD: 0 K/UL (ref 0–0.01)
NRBC BLD-RTO: 0 PER 100 WBC
PH UR STRIP: 6 (ref 5–8)
PLATELET # BLD AUTO: 219 K/UL (ref 150–400)
PMV BLD AUTO: 10.8 FL (ref 8.9–12.9)
POTASSIUM SERPL-SCNC: 3.3 MMOL/L (ref 3.5–5.1)
PROT SERPL-MCNC: 7 G/DL (ref 6.4–8.2)
PROT UR STRIP-MCNC: 30 MG/DL
RBC # BLD AUTO: 4.61 M/UL (ref 3.8–5.2)
RBC #/AREA URNS HPF: ABNORMAL /HPF (ref 0–5)
SODIUM SERPL-SCNC: 141 MMOL/L (ref 136–145)
SP GR UR REFRACTOMETRY: 1.02 (ref 1–1.03)
SPECIMEN HOLD: NORMAL
URINE CULTURE IF INDICATED: ABNORMAL
UROBILINOGEN UR QL STRIP.AUTO: 1 EU/DL (ref 0.2–1)
WBC # BLD AUTO: 10.4 K/UL (ref 3.6–11)
WBC URNS QL MICRO: ABNORMAL /HPF (ref 0–4)

## 2024-07-26 PROCEDURE — 80053 COMPREHEN METABOLIC PANEL: CPT

## 2024-07-26 PROCEDURE — 81001 URINALYSIS AUTO W/SCOPE: CPT

## 2024-07-26 PROCEDURE — 85025 COMPLETE CBC W/AUTO DIFF WBC: CPT

## 2024-07-26 PROCEDURE — 99283 EMERGENCY DEPT VISIT LOW MDM: CPT

## 2024-07-26 PROCEDURE — 6370000000 HC RX 637 (ALT 250 FOR IP): Performed by: EMERGENCY MEDICINE

## 2024-07-26 PROCEDURE — 36415 COLL VENOUS BLD VENIPUNCTURE: CPT

## 2024-07-26 RX ORDER — CEFDINIR 300 MG/1
300 CAPSULE ORAL
Status: COMPLETED | OUTPATIENT
Start: 2024-07-26 | End: 2024-07-26

## 2024-07-26 RX ORDER — POTASSIUM CHLORIDE 750 MG/1
40 TABLET, FILM COATED, EXTENDED RELEASE ORAL ONCE
Status: COMPLETED | OUTPATIENT
Start: 2024-07-26 | End: 2024-07-26

## 2024-07-26 RX ORDER — CEFDINIR 300 MG/1
300 CAPSULE ORAL 2 TIMES DAILY
Qty: 10 CAPSULE | Refills: 0 | Status: SHIPPED | OUTPATIENT
Start: 2024-07-26 | End: 2024-07-31

## 2024-07-26 RX ADMIN — POTASSIUM CHLORIDE 40 MEQ: 750 TABLET, EXTENDED RELEASE ORAL at 15:10

## 2024-07-26 RX ADMIN — CEFDINIR 300 MG: 300 CAPSULE ORAL at 13:42

## 2024-07-26 NOTE — DISCHARGE INSTRUCTIONS
Return with any new or worsening symptoms.  Discontinue the Macrobid and take the cefdinir as directed.

## 2024-07-26 NOTE — ED NOTES
84-year-old female with a history of obesity, hypertension, depression, cancer, psych, smoker, pancreatic cyst presents with confusion and hallucinations.  Presents with daughter who provides HPI stating she has had ongoing workup and treatment for UTI without urinary symptoms.  Followed by PCP.  Was started on Macrobid then changed to Keflex and then changed back to Macrobid again.  Seen 7/20/2024 in this ED.  Denies fevers, chest pain, shortness of breath, nausea, vomiting, urinary symptoms, bleeding.  Endorses some intermittent diarrhea.  Currently denies pain.  Daughter is concerned because of her mental status and currently living alone.        11:58 AM  I have evaluated the patient as the Provider in Rapid Medical Evaluation (RME). I have reviewed her vital signs and the triage nurse assessment. I have talked with the patient and any available family and advised that I am the provider in triage and have ordered the appropriate study to initiate their work up based on the clinical presentation during my assessment. I have advised that the patient will be accommodated in the Main ED as soon as possible. I have also requested to contact the triage nurse or myself immediately if the patient experiences any changes in their condition during this brief waiting period.  NAMRATA Strickland NP, Kelly E, APRN - NP  07/26/24 6731    
Care assumed from Dr. Gregg, 84-year-old female presenting with complaints of altered mental status, in the setting of a history of frontotemporal dementia, recently treated for urinary tract infection, reassuring lab work, urine appears to be improving appropriately, patient seen by case management and provided resources, recommend primary care follow-up, return precautions given.     Vick Smith MD  07/26/24 9186    
Case management at bedside.  
no concerns
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
## 2024-07-26 NOTE — ED TRIAGE NOTES
Patient reports being referred to ER for urinalysis. Was seen here on July 20th and diagnosed with UTI. Was prescribed Keflex and says it gave her pruritus. Currently taking Macrobid. Daughter reports increased confusion and delusional thoughts.
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
## 2025-04-04 ENCOUNTER — HOSPITAL ENCOUNTER (EMERGENCY)
Facility: HOSPITAL | Age: 85
Discharge: HOME | End: 2025-04-05
Attending: EMERGENCY MEDICINE
Payer: MEDICARE

## 2025-04-04 ENCOUNTER — APPOINTMENT (OUTPATIENT)
Facility: HOSPITAL | Age: 85
End: 2025-04-04
Payer: MEDICARE

## 2025-04-04 DIAGNOSIS — R44.3 HALLUCINATIONS: Primary | ICD-10-CM

## 2025-04-04 LAB
ALBUMIN SERPL-MCNC: 3.6 G/DL (ref 3.5–5)
ALBUMIN/GLOB SERPL: 1 (ref 1.1–2.2)
ALP SERPL-CCNC: 93 U/L (ref 45–117)
ALT SERPL-CCNC: 28 U/L (ref 12–78)
AMPHET UR QL SCN: NEGATIVE
ANION GAP SERPL CALC-SCNC: 3 MMOL/L (ref 2–12)
APAP SERPL-MCNC: <2 UG/ML (ref 10–30)
APPEARANCE UR: ABNORMAL
AST SERPL-CCNC: 21 U/L (ref 15–37)
BACTERIA URNS QL MICRO: NEGATIVE /HPF
BARBITURATES UR QL SCN: NEGATIVE
BASOPHILS # BLD: 0.04 K/UL (ref 0–0.1)
BASOPHILS NFR BLD: 0.5 % (ref 0–1)
BENZODIAZ UR QL: NEGATIVE
BILIRUB SERPL-MCNC: 0.5 MG/DL (ref 0.2–1)
BILIRUB UR QL: NEGATIVE
BUN SERPL-MCNC: 18 MG/DL (ref 6–20)
BUN/CREAT SERPL: 21 (ref 12–20)
CALCIUM SERPL-MCNC: 9.5 MG/DL (ref 8.5–10.1)
CANNABINOIDS UR QL SCN: NEGATIVE
CAOX CRY URNS QL MICRO: ABNORMAL
CHLORIDE SERPL-SCNC: 109 MMOL/L (ref 97–108)
CO2 SERPL-SCNC: 27 MMOL/L (ref 21–32)
COCAINE UR QL SCN: NEGATIVE
COLOR UR: ABNORMAL
COMMENT:: NORMAL
CREAT SERPL-MCNC: 0.86 MG/DL (ref 0.55–1.02)
DIFFERENTIAL METHOD BLD: NORMAL
EOSINOPHIL # BLD: 0.13 K/UL (ref 0–0.4)
EOSINOPHIL NFR BLD: 1.7 % (ref 0–7)
EPITH CASTS URNS QL MICRO: ABNORMAL /LPF
ERYTHROCYTE [DISTWIDTH] IN BLOOD BY AUTOMATED COUNT: 13.2 % (ref 11.5–14.5)
ETHANOL SERPL-MCNC: <10 MG/DL (ref 0–0.08)
GLOBULIN SER CALC-MCNC: 3.5 G/DL (ref 2–4)
GLUCOSE SERPL-MCNC: 97 MG/DL (ref 65–100)
GLUCOSE UR STRIP.AUTO-MCNC: NEGATIVE MG/DL
HCT VFR BLD AUTO: 43.8 % (ref 35–47)
HGB BLD-MCNC: 14.9 G/DL (ref 11.5–16)
HGB UR QL STRIP: NEGATIVE
IMM GRANULOCYTES # BLD AUTO: 0.02 K/UL (ref 0–0.04)
IMM GRANULOCYTES NFR BLD AUTO: 0.3 % (ref 0–0.5)
KETONES UR QL STRIP.AUTO: ABNORMAL MG/DL
LEUKOCYTE ESTERASE UR QL STRIP.AUTO: ABNORMAL
LIPASE SERPL-CCNC: 37 U/L (ref 13–75)
LYMPHOCYTES # BLD: 2.81 K/UL (ref 0.8–3.5)
LYMPHOCYTES NFR BLD: 35.8 % (ref 12–49)
Lab: NORMAL
MCH RBC QN AUTO: 29.6 PG (ref 26–34)
MCHC RBC AUTO-ENTMCNC: 34 G/DL (ref 30–36.5)
MCV RBC AUTO: 86.9 FL (ref 80–99)
METHADONE UR QL: NEGATIVE
MONOCYTES # BLD: 0.64 K/UL (ref 0–1)
MONOCYTES NFR BLD: 8.1 % (ref 5–13)
NEUTS SEG # BLD: 4.22 K/UL (ref 1.8–8)
NEUTS SEG NFR BLD: 53.6 % (ref 32–75)
NITRITE UR QL STRIP.AUTO: NEGATIVE
NRBC # BLD: 0 K/UL (ref 0–0.01)
NRBC BLD-RTO: 0 PER 100 WBC
OPIATES UR QL: NEGATIVE
PCP UR QL: NEGATIVE
PH UR STRIP: 5.5 (ref 5–8)
PLATELET # BLD AUTO: 205 K/UL (ref 150–400)
PMV BLD AUTO: 12.3 FL (ref 8.9–12.9)
POTASSIUM SERPL-SCNC: 3.4 MMOL/L (ref 3.5–5.1)
PROT SERPL-MCNC: 7.1 G/DL (ref 6.4–8.2)
PROT UR STRIP-MCNC: 30 MG/DL
RBC # BLD AUTO: 5.04 M/UL (ref 3.8–5.2)
RBC #/AREA URNS HPF: ABNORMAL /HPF (ref 0–5)
SALICYLATES SERPL-MCNC: 4.2 MG/DL (ref 2.8–20)
SODIUM SERPL-SCNC: 139 MMOL/L (ref 136–145)
SP GR UR REFRACTOMETRY: 1.02 (ref 1–1.03)
SPECIMEN HOLD: NORMAL
SPECIMEN HOLD: NORMAL
UROBILINOGEN UR QL STRIP.AUTO: 1 EU/DL (ref 0.2–1)
WBC # BLD AUTO: 7.9 K/UL (ref 3.6–11)
WBC URNS QL MICRO: ABNORMAL /HPF (ref 0–4)

## 2025-04-04 PROCEDURE — 70450 CT HEAD/BRAIN W/O DYE: CPT

## 2025-04-04 PROCEDURE — 99284 EMERGENCY DEPT VISIT MOD MDM: CPT

## 2025-04-04 PROCEDURE — 80143 DRUG ASSAY ACETAMINOPHEN: CPT

## 2025-04-04 PROCEDURE — 83690 ASSAY OF LIPASE: CPT

## 2025-04-04 PROCEDURE — 85025 COMPLETE CBC W/AUTO DIFF WBC: CPT

## 2025-04-04 PROCEDURE — 80053 COMPREHEN METABOLIC PANEL: CPT

## 2025-04-04 PROCEDURE — 81001 URINALYSIS AUTO W/SCOPE: CPT

## 2025-04-04 PROCEDURE — 80307 DRUG TEST PRSMV CHEM ANLYZR: CPT

## 2025-04-04 PROCEDURE — 82077 ASSAY SPEC XCP UR&BREATH IA: CPT

## 2025-04-04 PROCEDURE — 80179 DRUG ASSAY SALICYLATE: CPT

## 2025-04-05 VITALS
RESPIRATION RATE: 17 BRPM | DIASTOLIC BLOOD PRESSURE: 75 MMHG | SYSTOLIC BLOOD PRESSURE: 110 MMHG | BODY MASS INDEX: 32.34 KG/M2 | HEART RATE: 78 BPM | TEMPERATURE: 98 F | OXYGEN SATURATION: 96 % | WEIGHT: 176.81 LBS

## 2025-04-05 NOTE — ED NOTES
Went over discharge paper with patient. Patient ambulated out of ED accompanied by relative without incident.
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
## 2025-04-05 NOTE — ED TRIAGE NOTES
Pt to triage from home for evaluation of hallucinations.  Pt reports feeling paranoid and is hearing messages from her TV and from her watch.  Pt reports seeing people in her home that she knows are not there.  Pt reports feeling this for months

## 2025-04-05 NOTE — ED NOTES
9:26 PM  I have evaluated the patient as the Provider in Rapid Medical Evaluation (RME). I have reviewed her vital signs and the triage nurse assessment. I have talked with the patient and any available family and advised that I am the provider in triage and have ordered the appropriate study to initiate their work up based on the clinical presentation during my assessment. I have advised that the patient will be accommodated in the Main ED as soon as possible. I have also requested to contact the triage nurse or myself immediately if the patient experiences any changes in their condition during this brief waiting period.    84 y.o. female presents to ED with paranoia and hallucinations. Patient arrives with daughter-in-law. Patient reports that she has been feeling like someone is trying to kill her, get into her house, mason her. She reports that she hears things from the TV that others don't hear. She also adds that she feels like someone is listening to her through her watch. She reports that this has been going on for several months. Her family just found out about this last night. She notes that her doctor thought she might have a UTI, she was prescribed an antbiotic but notes that her urine was \"questionable\" in PCP office today. She has had frequent UTIs before but this is different. Denies any SI, HI, abdominal pain, fevers, chills, N/V/D.     TESSIE DING Ashley, PA  04/04/25 [id number]

## 2025-04-09 ENCOUNTER — TELEPHONE (OUTPATIENT)
Age: 85
End: 2025-04-09

## 2025-04-09 NOTE — TELEPHONE ENCOUNTER
PSR left voice message for patient's daughter (Nicho) for return call to schedule Neurology referral appointment.

## (undated) DEVICE — SOLIDIFIER MEDC 1200ML -- CONVERT TO 356117

## (undated) DEVICE — Device

## (undated) DEVICE — BAG BELONG PT PERS CLEAR HANDL

## (undated) DEVICE — CANN NASAL O2 CAPNOGRAPHY AD -- FILTERLINE

## (undated) DEVICE — TRAP SUC MUCOUS 70ML -- MEDICHOICE MEDLINE

## (undated) DEVICE — BAG SPEC BIOHZRD 10 X 10 IN --

## (undated) DEVICE — BASIN EMSIS 16OZ GRAPHITE PLAS KID SHP MOLD GRAD FOR ORAL

## (undated) DEVICE — CUFF RMFG BP INF SZ 11L DISP --

## (undated) DEVICE — CONTAINER SPEC 20 ML LID NEUT BUFF FORMALIN 10 % POLYPR STS

## (undated) DEVICE — SNARE ENDOSCP M L240CM W27MM SHTH DIA2.4MM CHN 2.8MM OVL

## (undated) DEVICE — SYRINGE 50ML E/T

## (undated) DEVICE — REM POLYHESIVE ADULT PATIENT RETURN ELECTRODE: Brand: VALLEYLAB

## (undated) DEVICE — 1200 GUARD II KIT W/5MM TUBE W/O VAC TUBE: Brand: GUARDIAN

## (undated) DEVICE — FORCEPS BX L240CM JAW DIA2.8MM L CAP W/ NDL MIC MESH TOOTH

## (undated) DEVICE — ELECTRODE,RADIOTRANSLUCENT,FOAM,3PK: Brand: MEDLINE

## (undated) DEVICE — KENDALL RADIOLUCENT FOAM MONITORING ELECTRODE -RECTANGULAR SHAPE: Brand: KENDALL

## (undated) DEVICE — SET ADMIN 16ML TBNG L100IN 2 Y INJ SITE IV PIGGY BK DISP

## (undated) DEVICE — CATH IV AUTOGRD BC BLU 22GA 25 -- INSYTE

## (undated) DEVICE — POLYP TRAP: Brand: TRAPEASE®

## (undated) DEVICE — SENSOR RMFG PLSE OXMTR INF --

## (undated) DEVICE — 3M™ CUROS™ DISINFECTING CAP FOR NEEDLELESS CONNECTORS 270/CARTON 20 CARTONS/CASE CFF1-270: Brand: CUROS™

## (undated) DEVICE — KIT COLON W/ 1.1OZ LUB AND 2 END

## (undated) DEVICE — ADULT SPO2 SENSOR: Brand: NELLCOR